# Patient Record
Sex: FEMALE | Race: AMERICAN INDIAN OR ALASKA NATIVE | NOT HISPANIC OR LATINO | Employment: FULL TIME | URBAN - METROPOLITAN AREA
[De-identification: names, ages, dates, MRNs, and addresses within clinical notes are randomized per-mention and may not be internally consistent; named-entity substitution may affect disease eponyms.]

---

## 2020-04-27 ENCOUNTER — TELEPHONE (OUTPATIENT)
Dept: FAMILY MEDICINE | Facility: CLINIC | Age: 70
End: 2020-04-27

## 2020-04-27 NOTE — TELEPHONE ENCOUNTER
----- Message from Elba Mcguire MA sent at 4/27/2020  9:41 AM CDT -----  Contact: self/[phone] (M)      ----- Message -----  From: Annie Conroy  Sent: 4/27/2020   9:37 AM CDT  To: [facility] Staff    Patient called said that she does not want a new pcp that she is okay with the pcp at , but she want to have an appointment with Dr Garrison that she already spoke with him about it. Please call and advise. Thank you

## 2020-04-27 NOTE — TELEPHONE ENCOUNTER
----- Message from Anniesampson Conroy sent at 4/27/2020  9:30 AM CDT -----  Contact: self/[phone] (M)  Requesting an RX refill or new RX.  Is this a refill or new RX:  Refill 1  RX name and strength: Chlorthalidone 25 mg  Directions (copy/paste from chart):    Is this a 30 day or 90 day RX:    Local pharmacy or mail order pharmacy:  Mail order  Pharmacy name and phone # (copy/paste from chart):   [facility] - [place], [place] - [address] [phone] (Phone)  [phone] (Fax)  Comments:      Requesting an RX refill or new RX.  Is this a refill or new RX:  Refill 2  RX name and strength: levothyroxine (SYNTHROID) 88 MCG tablet  Directions (copy/paste from chart):    Is this a 30 day or 90 day RX:    Local pharmacy or mail order pharmacy:  Mail order  Pharmacy name and phone # (copy/paste from chart):   [facility] - [place], [place] - [address] [phone] (Phone)  [phone] (Fax)  Comments:  Patient said that she was told by PCP to call her for refills but patient is not a patient with Dr Garrison here at [facility], she wants to continue with [facility].

## 2020-04-27 NOTE — TELEPHONE ENCOUNTER
----- Message from Hermelindohumera Gene sent at 4/27/2020  9:23 AM CDT -----  Contact: Self   Requesting an RX refill or new RX.  Is this a refill or new RX:    RX name and strength: metoprolol succinate (TOPROL-XL) 25 MG 24 hr tablet  Directions (copy/paste from chart):   Take 2 tablets (50 mg total) by mouth once daily.   Is this a 30 day or 90 day RX:    Local pharmacy or mail order pharmacy:  Mail order  Pharmacy name and phone # (copy/paste from chart):   [facility]  Comments:            Requesting an RX refill or new RX.  Is this a refill or new RX:    RX name and strength: levothyroxine (SYNTHROID) 88 MCG tablet  Directions (copy/paste from chart):  Take 88 mcg by mouth once daily  Is this a 30 day or 90 day RX:    Local pharmacy or mail order pharmacy:  Mail order  Pharmacy name and phone # (copy/paste from chart):   [facility]   Comments:

## 2020-05-28 LAB — HEMOCCULT STL QL IA: NEGATIVE

## 2020-06-09 ENCOUNTER — PATIENT OUTREACH (OUTPATIENT)
Dept: ADMINISTRATIVE | Facility: HOSPITAL | Age: 70
End: 2020-06-09

## 2020-06-09 ENCOUNTER — TELEPHONE (OUTPATIENT)
Dept: ADMINISTRATIVE | Facility: HOSPITAL | Age: 70
End: 2020-06-09

## 2020-07-08 ENCOUNTER — PATIENT OUTREACH (OUTPATIENT)
Dept: ADMINISTRATIVE | Facility: HOSPITAL | Age: 70
End: 2020-07-08

## 2020-07-08 DIAGNOSIS — Z78.0 MENOPAUSE: Primary | ICD-10-CM

## 2020-07-22 ENCOUNTER — OFFICE VISIT (OUTPATIENT)
Dept: FAMILY MEDICINE | Facility: CLINIC | Age: 70
End: 2020-07-22
Payer: COMMERCIAL

## 2020-07-22 VITALS
WEIGHT: 150.25 LBS | OXYGEN SATURATION: 98 % | SYSTOLIC BLOOD PRESSURE: 110 MMHG | BODY MASS INDEX: 25.03 KG/M2 | HEART RATE: 78 BPM | TEMPERATURE: 99 F | DIASTOLIC BLOOD PRESSURE: 62 MMHG | HEIGHT: 65 IN

## 2020-07-22 DIAGNOSIS — I47.19 AVNRT (AV NODAL RE-ENTRY TACHYCARDIA): ICD-10-CM

## 2020-07-22 DIAGNOSIS — Z20.822 CLOSE EXPOSURE TO COVID-19 VIRUS: ICD-10-CM

## 2020-07-22 DIAGNOSIS — Z12.31 SCREENING MAMMOGRAM, ENCOUNTER FOR: ICD-10-CM

## 2020-07-22 DIAGNOSIS — E03.9 HYPOTHYROIDISM (ACQUIRED): ICD-10-CM

## 2020-07-22 DIAGNOSIS — R22.32 SUBCUTANEOUS NODULE OF LEFT FOREARM: ICD-10-CM

## 2020-07-22 DIAGNOSIS — I47.10 SVT (SUPRAVENTRICULAR TACHYCARDIA): ICD-10-CM

## 2020-07-22 DIAGNOSIS — E11.9 TYPE 2 DIABETES MELLITUS WITHOUT COMPLICATION, WITHOUT LONG-TERM CURRENT USE OF INSULIN: Primary | ICD-10-CM

## 2020-07-22 DIAGNOSIS — I10 ESSENTIAL HYPERTENSION: ICD-10-CM

## 2020-07-22 DIAGNOSIS — E78.2 HYPERLIPIDEMIA, MIXED: ICD-10-CM

## 2020-07-22 DIAGNOSIS — Z11.59 SCREENING FOR VIRAL DISEASE: ICD-10-CM

## 2020-07-22 PROCEDURE — 99999 PR PBB SHADOW E&M-EST. PATIENT-LVL IV: CPT | Mod: PBBFAC,,, | Performed by: INTERNAL MEDICINE

## 2020-07-22 PROCEDURE — 99214 OFFICE O/P EST MOD 30 MIN: CPT | Mod: S$GLB,,, | Performed by: INTERNAL MEDICINE

## 2020-07-22 PROCEDURE — 1101F PT FALLS ASSESS-DOCD LE1/YR: CPT | Mod: CPTII,S$GLB,, | Performed by: INTERNAL MEDICINE

## 2020-07-22 PROCEDURE — 3008F BODY MASS INDEX DOCD: CPT | Mod: CPTII,S$GLB,, | Performed by: INTERNAL MEDICINE

## 2020-07-22 PROCEDURE — 3008F PR BODY MASS INDEX (BMI) DOCUMENTED: ICD-10-PCS | Mod: CPTII,S$GLB,, | Performed by: INTERNAL MEDICINE

## 2020-07-22 PROCEDURE — 1101F PR PT FALLS ASSESS DOC 0-1 FALLS W/OUT INJ PAST YR: ICD-10-PCS | Mod: CPTII,S$GLB,, | Performed by: INTERNAL MEDICINE

## 2020-07-22 PROCEDURE — 99499 RISK ADDL DX/OHS AUDIT: ICD-10-PCS | Mod: S$GLB,,, | Performed by: INTERNAL MEDICINE

## 2020-07-22 PROCEDURE — 99999 PR PBB SHADOW E&M-EST. PATIENT-LVL IV: ICD-10-PCS | Mod: PBBFAC,,, | Performed by: INTERNAL MEDICINE

## 2020-07-22 PROCEDURE — 99499 UNLISTED E&M SERVICE: CPT | Mod: S$GLB,,, | Performed by: INTERNAL MEDICINE

## 2020-07-22 PROCEDURE — 1159F MED LIST DOCD IN RCRD: CPT | Mod: S$GLB,,, | Performed by: INTERNAL MEDICINE

## 2020-07-22 PROCEDURE — 99214 PR OFFICE/OUTPT VISIT, EST, LEVL IV, 30-39 MIN: ICD-10-PCS | Mod: S$GLB,,, | Performed by: INTERNAL MEDICINE

## 2020-07-22 PROCEDURE — 1159F PR MEDICATION LIST DOCUMENTED IN MEDICAL RECORD: ICD-10-PCS | Mod: S$GLB,,, | Performed by: INTERNAL MEDICINE

## 2020-07-22 RX ORDER — LEVOTHYROXINE SODIUM 88 UG/1
88 TABLET ORAL DAILY
Qty: 90 TABLET | Refills: 3 | Status: SHIPPED | OUTPATIENT
Start: 2020-07-22 | End: 2021-07-15 | Stop reason: SDUPTHER

## 2020-07-22 RX ORDER — METOPROLOL SUCCINATE 25 MG/1
50 TABLET, EXTENDED RELEASE ORAL DAILY
Qty: 90 TABLET | Refills: 3 | Status: SHIPPED | OUTPATIENT
Start: 2020-07-22 | End: 2021-02-10

## 2020-07-22 NOTE — PROGRESS NOTES
[facility] Note    Chief Complaint      Chief Complaint   Patient presents with    Establish Care       History of Present Illness      Geraldine Longoria is a 69 y.o. female with chronic conditions of DM2, HTN, HLD, hypothyroidism, AVNRT who presents today for: re-establish care from  and review chronic conditions.  Complains of subcutaneous nodule on left forearm.  DM2: Last A1C 6.4 7/2019.  Not on meds.  Eye exam UTD with DR. Ma.  HTN: BP at goal on metoprolol.  Pt wants to try discontinuing.  HLD: Last .  Due for repeat.  Hypothyroidism: Controlled on synthroid.  TSH due for repeat.  AVNRT: On metoprolol to control.  Has seen Cardiology previously.  Flu shot declines.  Td 2007, declines.  Pneumovax, prevnar declines.  Shingrix declines.  Mammogram due, will schedule.  FIT 2020 negative.    Past Medical History:  Past Medical History:   Diagnosis Date    Thyroid disease        Past Surgical History:   has a past surgical history that includes Kidney surgery.    Family History:  family history is not on file.     Social History:  Social History     Tobacco Use    Smoking status: Never Smoker   Substance Use Topics    Alcohol use: No    Drug use: Not on file       Review of Systems   Constitutional: Negative for chills, fever and malaise/fatigue.   Respiratory: Negative for shortness of breath.    Cardiovascular: Negative for chest pain.   Gastrointestinal: Negative for constipation, diarrhea, nausea and vomiting.   Skin: Negative for rash.   Neurological: Negative for weakness.        Medications:  Outpatient Encounter Medications as of 7/22/2020   Medication Sig Dispense Refill    levothyroxine (SYNTHROID) 88 MCG tablet Take 88 mcg by mouth once daily.      metoprolol succinate (TOPROL-XL) 25 MG 24 hr tablet Take 2 tablets (50 mg total) by mouth once daily. 90 tablet 11     No facility-administered encounter medications on file as of 7/22/2020.        Allergies:  Review of patient's  "allergies indicates:  No Known Allergies    Health Maintenance:  Immunization History   Administered Date(s) Administered    Td (ADULT) 10/19/2007      Health Maintenance   Topic Date Due    Hepatitis C Screening  1950    Lipid Panel  1950    TETANUS VACCINE  07/27/1969    DEXA SCAN  07/27/1991    Pneumococcal Vaccine (65+ Low/Medium Risk) (1 of 2 - PCV13) 07/27/2016    Mammogram  09/18/2021        Physical Exam      Vital Signs  Temp: 98.9 °F (37.2 °C)  Temp src: Oral  Pulse: 78  SpO2: 98 %  BP: 110/62  BP Location: Left arm  Patient Position: Sitting  Height and Weight  Height: 5' 5" (165.1 cm)  Weight: 68.2 kg (150 lb 3.9 oz)  BSA (Calculated - sq m): 1.77 sq meters  BMI (Calculated): 25  Weight in (lb) to have BMI = 25: 149.9]    Physical Exam  Vitals signs reviewed.   Constitutional:       Appearance: She is well-developed.   HENT:      Head: Normocephalic and atraumatic.      Right Ear: External ear normal.      Left Ear: External ear normal.   Eyes:      Conjunctiva/sclera: Conjunctivae normal.      Pupils: Pupils are equal, round, and reactive to light.   Neck:      Vascular: No carotid bruit.   Cardiovascular:      Rate and Rhythm: Normal rate and regular rhythm.      Heart sounds: Normal heart sounds. No murmur.   Pulmonary:      Effort: Pulmonary effort is normal.      Breath sounds: Normal breath sounds. No wheezing or rales.   Abdominal:      General: Bowel sounds are normal. There is no distension.      Palpations: Abdomen is soft.      Tenderness: There is no abdominal tenderness.          Laboratory:  CBC:      CMP:        Invalid input(s): CREATININ  URINALYSIS:       LIPIDS:      TSH:      A1C:        Assessment/Plan     Geraldine Longoria is a 69 y.o.female with:    1. Type 2 diabetes mellitus without complication, without long-term current use of insulin  - CBC auto differential; Future  - Comprehensive metabolic panel; Future  - Hemoglobin A1C; Future  - Microalbumin/creatinine " urine ratio; Future  Continue diet.  Update labs.  Request eye exam.  2. Essential hypertension  - Comprehensive metabolic panel; Future  Continue current meds.  Ok to discontinue to see if she still needs it.  3. Hyperlipidemia, mixed  - Comprehensive metabolic panel; Future  - Lipid Panel; Future  Cont diet.  Update labs  4. Hypothyroidism (acquired)  - CBC auto differential; Future  - TSH; Future  - T4, free; Future  Continue current meds.  UPdate labs  5. AVNRT (AV aftab re-entry tachycardia)  Continue current meds.  Ok to discontinue if preferred  6. Subcutaneous nodule of left forearm  - US Extremity Non Vascular Complete Left; Future  Evaluate with ultrasound  7. Screening for viral disease  - COVID-19 (SARS CoV-2) IgG Antibody; Future    8. Close Exposure to Covid-19 Virus  - COVID-19 (SARS CoV-2) IgG Antibody; Future    9. Screening mammogram, encounter for  - Mammo Digital Screening Bilat w/ Tirso; Future       Chronic conditions status updated as per HPI.  Other than changes above, cont current medications and maintain follow up with specialists.  Return to clinic in 6 months.    Brett Garrison MD  [facility]

## 2020-07-27 LAB
ALBUMIN/CREAT UR: NORMAL MCG/MG CREAT
CREAT UR-MCNC: 52 MG/DL (ref 20–275)
FREE T4: 1.5 NANOGRAM/DL (ref 0.9–1.7)
MICROALBUMIN UR-MCNC: <0.2 MG/DL
SARS-COV-2 IGG SERPL QL IA: NEGATIVE

## 2020-07-29 RX ORDER — CHLORTHALIDONE 25 MG/1
25 TABLET ORAL DAILY
COMMUNITY
End: 2020-07-29 | Stop reason: SDUPTHER

## 2020-07-29 RX ORDER — CHLORTHALIDONE 25 MG/1
25 TABLET ORAL DAILY
Qty: 90 TABLET | Refills: 3 | Status: SHIPPED | OUTPATIENT
Start: 2020-07-29 | End: 2021-07-15 | Stop reason: SDUPTHER

## 2020-07-29 NOTE — TELEPHONE ENCOUNTER
Care Due:                  Date            Visit Type   Department     Provider  --------------------------------------------------------------------------------                                ESTABLISHED   [facility]  Last Visit: 07-      PATIENT      [facility]  Brett Lenoard                              ESTABLISHED   [facility]  Next Visit: 01-      [facility]  Brett Leonard                                                            Last  Test          Frequency    Reason                     Performed    Due Date  --------------------------------------------------------------------------------    TSH.........  12 months..  levothyroxine............  Not Found    Overdue    Powered by [employer]. Reference number: [id number]. 7/29/2020 12:01:54 PM CDT

## 2020-07-29 NOTE — TELEPHONE ENCOUNTER
----- Message from Natalei Mcgrath sent at 7/29/2020 11:38 AM CDT -----  Contact: Pt [phone]  Patient is requesting a call about a prescription.    Please call and advise.    Thank You

## 2020-07-29 NOTE — TELEPHONE ENCOUNTER
Spoke with pt. Pt stated that the wrong medication was sent to [facility]. Pt states that she has never taken metoprolol. She is only taking Chlorthalidone 25 mg for blood pressure. Pt stated that she would like that rx to be sent to [facility]. Please advise.

## 2020-09-29 ENCOUNTER — PATIENT MESSAGE (OUTPATIENT)
Dept: OTHER | Facility: OTHER | Age: 70
End: 2020-09-29

## 2020-10-14 LAB
ALBUMIN: 4.1 GRAM/DL (ref 3.5–5)
ALP SERPL-CCNC: 82 UNIT/L (ref 38–126)
ALT SERPL W P-5'-P-CCNC: <10 UNIT/L (ref 7–56)
ANION GAP SERPL CALC-SCNC: 15 MEQ/L (ref 9–18)
AST SERPL-CCNC: 23 UNIT/L (ref 7–40)
BASOPHILS ABSOLUTE COUNT: 0 K/UL (ref 0–0.2)
BASOPHILS NFR BLD: 0.8 % (ref 0–2)
BILIRUB SERPL-MCNC: 0.4 MG/DL (ref 0–1.2)
BUN BLD-MCNC: 8 MG/DL (ref 7–21)
BUN/CREAT SERPL: 9 RATIO (ref 6–22)
CALC OSMOLALITY: 275 MOSM/KG (ref 275–295)
CALCIUM SERPL-MCNC: 10.2 MG/DL (ref 8.5–10.3)
CHLORIDE SERPL-SCNC: 100 MEQ/L (ref 98–107)
CHOL/HDLC RATIO: 4
CHOLEST SERPL-MSCNC: 252 MG/DL (ref 100–200)
CO2 SERPL-SCNC: 27 MEQ/L (ref 21–31)
CREAT SERPL-MCNC: 0.9 MG/DL (ref 0.5–1)
DIFFERENTIAL TYPE: NORMAL
EOSINOPHIL NFR BLD: 2.1 % (ref 0–4)
EOSINOPHILS ABSOLUTE COUNT: 0.1 K/UL (ref 0–0.7)
ERYTHROCYTE [DISTWIDTH] IN BLOOD BY AUTOMATED COUNT: 13.1 GRAM/DL (ref 12–15.3)
GFR: 63.9 ML/MIN/1.73M2
GLUCOSE SERPL-MCNC: 114 MG/DL (ref 70–100)
HBA1C MFR BLD: 6.4 % (ref 4.5–5.7)
HCT VFR BLD AUTO: 37 % (ref 37–47)
HDLC SERPL-MCNC: 71 MG/DL (ref 40–75)
HGB BLD-MCNC: 12.6 GRAM/DL (ref 12–16)
LDLC SERPL CALC-MCNC: 166 MG/DL (ref 0–125)
LYMPHOCYTES %: 28.4 % (ref 15–45)
LYMPHOCYTES ABSOLUTE COUNT: 1.5 K/UL (ref 1–4.2)
MCH RBC QN AUTO: 28.4 PICOGRAM (ref 27–33)
MCHC RBC AUTO-ENTMCNC: 34.1 GRAM/DL (ref 32–36)
MCV RBC AUTO: 83.1 FEMTOLITER (ref 81–99)
MONOCYTES %: 6.5 % (ref 3–13)
MONOCYTES ABSOLUTE COUNT: 0.4 K/UL (ref 0.1–0.8)
NEUTROPHILS ABSOLUTE COUNT: 3.3 K/UL (ref 2.1–7.6)
NEUTROPHILS RELATIVE PERCENT: 62.2 % (ref 32–80)
NONHDLC SERPL-MCNC: 181 MG/DL (ref 60–125)
PLATELET # BLD AUTO: 224 K/UL (ref 150–350)
PMV BLD AUTO: 10.2 FEMTOLITER (ref 7–10.2)
POTASSIUM SERPL-SCNC: 4 MEQ/L (ref 3.5–5)
RBC # BLD AUTO: 4.45 MIL/UL (ref 4.2–5.4)
SODIUM BLD-SCNC: 138 MEQ/L (ref 135–145)
TOTAL PROTEIN: 6.6 GRAM/DL (ref 6.3–8.2)
TRIGL SERPL-MCNC: 95 MG/DL (ref 30–150)
TSH SERPL DL<=0.005 MIU/L-ACNC: 1.12 UIL/ML (ref 0.35–4)
WBC # BLD AUTO: 5.4 K/UL (ref 4.5–11)

## 2020-12-02 ENCOUNTER — TELEPHONE (OUTPATIENT)
Dept: FAMILY MEDICINE | Facility: CLINIC | Age: 70
End: 2020-12-02

## 2020-12-02 NOTE — TELEPHONE ENCOUNTER
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  Can be surgically removed if becomes significantly large or painful.  Otherwise, leave it be

## 2020-12-09 ENCOUNTER — PATIENT OUTREACH (OUTPATIENT)
Dept: ADMINISTRATIVE | Facility: HOSPITAL | Age: 70
End: 2020-12-09

## 2020-12-09 ENCOUNTER — TELEPHONE (OUTPATIENT)
Dept: ADMINISTRATIVE | Facility: HOSPITAL | Age: 70
End: 2020-12-09

## 2020-12-10 ENCOUNTER — TELEPHONE (OUTPATIENT)
Dept: FAMILY MEDICINE | Facility: CLINIC | Age: 70
End: 2020-12-10

## 2020-12-10 NOTE — TELEPHONE ENCOUNTER
----- Message from Tamara Jensen sent at 12/10/2020  9:36 AM CST -----  Regarding: Call back  Contact: [phone]  Patient Returning Your Phone Call

## 2020-12-11 ENCOUNTER — PATIENT MESSAGE (OUTPATIENT)
Dept: OTHER | Facility: OTHER | Age: 70
End: 2020-12-11

## 2021-02-10 ENCOUNTER — OFFICE VISIT (OUTPATIENT)
Dept: FAMILY MEDICINE | Facility: CLINIC | Age: 71
End: 2021-02-10
Payer: COMMERCIAL

## 2021-02-10 VITALS
WEIGHT: 143.31 LBS | OXYGEN SATURATION: 93 % | TEMPERATURE: 98 F | SYSTOLIC BLOOD PRESSURE: 102 MMHG | DIASTOLIC BLOOD PRESSURE: 70 MMHG | BODY MASS INDEX: 23.88 KG/M2 | HEIGHT: 65 IN | HEART RATE: 77 BPM

## 2021-02-10 DIAGNOSIS — I10 ESSENTIAL HYPERTENSION: ICD-10-CM

## 2021-02-10 DIAGNOSIS — E11.9 TYPE 2 DIABETES MELLITUS WITHOUT COMPLICATION, WITHOUT LONG-TERM CURRENT USE OF INSULIN: Primary | ICD-10-CM

## 2021-02-10 DIAGNOSIS — I47.10 SVT (SUPRAVENTRICULAR TACHYCARDIA): ICD-10-CM

## 2021-02-10 PROCEDURE — 3074F PR MOST RECENT SYSTOLIC BLOOD PRESSURE < 130 MM HG: ICD-10-PCS | Mod: CPTII,S$GLB,, | Performed by: INTERNAL MEDICINE

## 2021-02-10 PROCEDURE — 99499 RISK ADDL DX/OHS AUDIT: ICD-10-PCS | Mod: HCNC,S$GLB,, | Performed by: INTERNAL MEDICINE

## 2021-02-10 PROCEDURE — 3044F HG A1C LEVEL LT 7.0%: CPT | Mod: CPTII,S$GLB,, | Performed by: INTERNAL MEDICINE

## 2021-02-10 PROCEDURE — 3078F PR MOST RECENT DIASTOLIC BLOOD PRESSURE < 80 MM HG: ICD-10-PCS | Mod: CPTII,S$GLB,, | Performed by: INTERNAL MEDICINE

## 2021-02-10 PROCEDURE — 99499 UNLISTED E&M SERVICE: CPT | Mod: HCNC,S$GLB,, | Performed by: INTERNAL MEDICINE

## 2021-02-10 PROCEDURE — 3008F PR BODY MASS INDEX (BMI) DOCUMENTED: ICD-10-PCS | Mod: CPTII,S$GLB,, | Performed by: INTERNAL MEDICINE

## 2021-02-10 PROCEDURE — 99999 PR PBB SHADOW E&M-EST. PATIENT-LVL III: CPT | Mod: PBBFAC,,, | Performed by: INTERNAL MEDICINE

## 2021-02-10 PROCEDURE — 1126F PR PAIN SEVERITY QUANTIFIED, NO PAIN PRESENT: ICD-10-PCS | Mod: S$GLB,,, | Performed by: INTERNAL MEDICINE

## 2021-02-10 PROCEDURE — 1126F AMNT PAIN NOTED NONE PRSNT: CPT | Mod: S$GLB,,, | Performed by: INTERNAL MEDICINE

## 2021-02-10 PROCEDURE — 1159F MED LIST DOCD IN RCRD: CPT | Mod: S$GLB,,, | Performed by: INTERNAL MEDICINE

## 2021-02-10 PROCEDURE — 3044F PR MOST RECENT HEMOGLOBIN A1C LEVEL <7.0%: ICD-10-PCS | Mod: CPTII,S$GLB,, | Performed by: INTERNAL MEDICINE

## 2021-02-10 PROCEDURE — 1101F PT FALLS ASSESS-DOCD LE1/YR: CPT | Mod: CPTII,S$GLB,, | Performed by: INTERNAL MEDICINE

## 2021-02-10 PROCEDURE — 3288F PR FALLS RISK ASSESSMENT DOCUMENTED: ICD-10-PCS | Mod: CPTII,S$GLB,, | Performed by: INTERNAL MEDICINE

## 2021-02-10 PROCEDURE — 3078F DIAST BP <80 MM HG: CPT | Mod: CPTII,S$GLB,, | Performed by: INTERNAL MEDICINE

## 2021-02-10 PROCEDURE — 99214 OFFICE O/P EST MOD 30 MIN: CPT | Mod: S$GLB,,, | Performed by: INTERNAL MEDICINE

## 2021-02-10 PROCEDURE — 3074F SYST BP LT 130 MM HG: CPT | Mod: CPTII,S$GLB,, | Performed by: INTERNAL MEDICINE

## 2021-02-10 PROCEDURE — 1159F PR MEDICATION LIST DOCUMENTED IN MEDICAL RECORD: ICD-10-PCS | Mod: S$GLB,,, | Performed by: INTERNAL MEDICINE

## 2021-02-10 PROCEDURE — 3008F BODY MASS INDEX DOCD: CPT | Mod: CPTII,S$GLB,, | Performed by: INTERNAL MEDICINE

## 2021-02-10 PROCEDURE — 99214 PR OFFICE/OUTPT VISIT, EST, LEVL IV, 30-39 MIN: ICD-10-PCS | Mod: S$GLB,,, | Performed by: INTERNAL MEDICINE

## 2021-02-10 PROCEDURE — 99999 PR PBB SHADOW E&M-EST. PATIENT-LVL III: ICD-10-PCS | Mod: PBBFAC,,, | Performed by: INTERNAL MEDICINE

## 2021-02-10 PROCEDURE — 1101F PR PT FALLS ASSESS DOC 0-1 FALLS W/OUT INJ PAST YR: ICD-10-PCS | Mod: CPTII,S$GLB,, | Performed by: INTERNAL MEDICINE

## 2021-02-10 PROCEDURE — 3288F FALL RISK ASSESSMENT DOCD: CPT | Mod: CPTII,S$GLB,, | Performed by: INTERNAL MEDICINE

## 2021-02-22 ENCOUNTER — PATIENT MESSAGE (OUTPATIENT)
Dept: PRIMARY CARE CLINIC | Facility: CLINIC | Age: 71
End: 2021-02-22

## 2021-03-08 ENCOUNTER — PATIENT MESSAGE (OUTPATIENT)
Dept: ADMINISTRATIVE | Facility: HOSPITAL | Age: 71
End: 2021-03-08

## 2021-04-13 ENCOUNTER — PES CALL (OUTPATIENT)
Dept: ADMINISTRATIVE | Facility: CLINIC | Age: 71
End: 2021-04-13

## 2021-04-16 ENCOUNTER — PATIENT MESSAGE (OUTPATIENT)
Dept: RESEARCH | Facility: HOSPITAL | Age: 71
End: 2021-04-16

## 2021-04-16 ENCOUNTER — IMMUNIZATION (OUTPATIENT)
Dept: FAMILY MEDICINE | Facility: CLINIC | Age: 71
End: 2021-04-16

## 2021-04-16 DIAGNOSIS — Z23 NEED FOR VACCINATION: Primary | ICD-10-CM

## 2021-04-16 PROCEDURE — 91300 COVID-19, MRNA, LNP-S, PF, 30 MCG/0.3 ML DOSE VACCINE: CPT | Mod: ,,, | Performed by: FAMILY MEDICINE

## 2021-04-16 PROCEDURE — 0001A COVID-19, MRNA, LNP-S, PF, 30 MCG/0.3 ML DOSE VACCINE: CPT | Mod: CV19,,, | Performed by: FAMILY MEDICINE

## 2021-04-16 PROCEDURE — 0001A COVID-19, MRNA, LNP-S, PF, 30 MCG/0.3 ML DOSE VACCINE: ICD-10-PCS | Mod: CV19,,, | Performed by: FAMILY MEDICINE

## 2021-04-16 PROCEDURE — 91300 COVID-19, MRNA, LNP-S, PF, 30 MCG/0.3 ML DOSE VACCINE: ICD-10-PCS | Mod: ,,, | Performed by: FAMILY MEDICINE

## 2021-05-07 ENCOUNTER — IMMUNIZATION (OUTPATIENT)
Dept: FAMILY MEDICINE | Facility: CLINIC | Age: 71
End: 2021-05-07
Payer: COMMERCIAL

## 2021-05-07 DIAGNOSIS — Z23 NEED FOR VACCINATION: Primary | ICD-10-CM

## 2021-05-07 PROCEDURE — 91300 COVID-19, MRNA, LNP-S, PF, 30 MCG/0.3 ML DOSE VACCINE: ICD-10-PCS | Mod: S$GLB,,, | Performed by: FAMILY MEDICINE

## 2021-05-07 PROCEDURE — 91300 COVID-19, MRNA, LNP-S, PF, 30 MCG/0.3 ML DOSE VACCINE: CPT | Mod: S$GLB,,, | Performed by: FAMILY MEDICINE

## 2021-05-07 PROCEDURE — 0002A COVID-19, MRNA, LNP-S, PF, 30 MCG/0.3 ML DOSE VACCINE: ICD-10-PCS | Mod: CV19,S$GLB,, | Performed by: FAMILY MEDICINE

## 2021-05-07 PROCEDURE — 0002A COVID-19, MRNA, LNP-S, PF, 30 MCG/0.3 ML DOSE VACCINE: CPT | Mod: CV19,S$GLB,, | Performed by: FAMILY MEDICINE

## 2021-06-02 LAB — HEMOCCULT STL QL IA: NEGATIVE

## 2021-06-29 ENCOUNTER — PATIENT OUTREACH (OUTPATIENT)
Dept: ADMINISTRATIVE | Facility: HOSPITAL | Age: 71
End: 2021-06-29

## 2021-06-29 ENCOUNTER — TELEPHONE (OUTPATIENT)
Dept: ADMINISTRATIVE | Facility: HOSPITAL | Age: 71
End: 2021-06-29

## 2021-07-14 ENCOUNTER — TELEPHONE (OUTPATIENT)
Dept: FAMILY MEDICINE | Facility: CLINIC | Age: 71
End: 2021-07-14

## 2021-07-15 ENCOUNTER — OFFICE VISIT (OUTPATIENT)
Dept: FAMILY MEDICINE | Facility: CLINIC | Age: 71
End: 2021-07-15
Payer: COMMERCIAL

## 2021-07-15 VITALS
DIASTOLIC BLOOD PRESSURE: 70 MMHG | BODY MASS INDEX: 24.43 KG/M2 | SYSTOLIC BLOOD PRESSURE: 120 MMHG | OXYGEN SATURATION: 98 % | HEIGHT: 65 IN | HEART RATE: 73 BPM | WEIGHT: 146.63 LBS | TEMPERATURE: 98 F

## 2021-07-15 DIAGNOSIS — Z00.00 ANNUAL PHYSICAL EXAM: ICD-10-CM

## 2021-07-15 DIAGNOSIS — E78.2 HYPERLIPIDEMIA, MIXED: ICD-10-CM

## 2021-07-15 DIAGNOSIS — N95.1 PERIMENOPAUSE: ICD-10-CM

## 2021-07-15 DIAGNOSIS — K59.00 CONSTIPATION, UNSPECIFIED CONSTIPATION TYPE: Primary | ICD-10-CM

## 2021-07-15 DIAGNOSIS — E11.9 TYPE 2 DIABETES MELLITUS WITHOUT COMPLICATION, WITHOUT LONG-TERM CURRENT USE OF INSULIN: ICD-10-CM

## 2021-07-15 DIAGNOSIS — E03.9 HYPOTHYROIDISM (ACQUIRED): ICD-10-CM

## 2021-07-15 DIAGNOSIS — I10 ESSENTIAL HYPERTENSION: ICD-10-CM

## 2021-07-15 PROCEDURE — 99214 OFFICE O/P EST MOD 30 MIN: CPT | Mod: S$GLB,,, | Performed by: INTERNAL MEDICINE

## 2021-07-15 PROCEDURE — 3008F PR BODY MASS INDEX (BMI) DOCUMENTED: ICD-10-PCS | Mod: CPTII,S$GLB,, | Performed by: INTERNAL MEDICINE

## 2021-07-15 PROCEDURE — 3044F PR MOST RECENT HEMOGLOBIN A1C LEVEL <7.0%: ICD-10-PCS | Mod: CPTII,S$GLB,, | Performed by: INTERNAL MEDICINE

## 2021-07-15 PROCEDURE — 3008F BODY MASS INDEX DOCD: CPT | Mod: CPTII,S$GLB,, | Performed by: INTERNAL MEDICINE

## 2021-07-15 PROCEDURE — 3078F PR MOST RECENT DIASTOLIC BLOOD PRESSURE < 80 MM HG: ICD-10-PCS | Mod: CPTII,S$GLB,, | Performed by: INTERNAL MEDICINE

## 2021-07-15 PROCEDURE — 99999 PR PBB SHADOW E&M-EST. PATIENT-LVL IV: ICD-10-PCS | Mod: PBBFAC,,, | Performed by: INTERNAL MEDICINE

## 2021-07-15 PROCEDURE — 99999 PR PBB SHADOW E&M-EST. PATIENT-LVL IV: CPT | Mod: PBBFAC,,, | Performed by: INTERNAL MEDICINE

## 2021-07-15 PROCEDURE — 3074F PR MOST RECENT SYSTOLIC BLOOD PRESSURE < 130 MM HG: ICD-10-PCS | Mod: CPTII,S$GLB,, | Performed by: INTERNAL MEDICINE

## 2021-07-15 PROCEDURE — 1159F PR MEDICATION LIST DOCUMENTED IN MEDICAL RECORD: ICD-10-PCS | Mod: S$GLB,,, | Performed by: INTERNAL MEDICINE

## 2021-07-15 PROCEDURE — 3044F HG A1C LEVEL LT 7.0%: CPT | Mod: CPTII,S$GLB,, | Performed by: INTERNAL MEDICINE

## 2021-07-15 PROCEDURE — 99214 PR OFFICE/OUTPT VISIT, EST, LEVL IV, 30-39 MIN: ICD-10-PCS | Mod: S$GLB,,, | Performed by: INTERNAL MEDICINE

## 2021-07-15 PROCEDURE — 3074F SYST BP LT 130 MM HG: CPT | Mod: CPTII,S$GLB,, | Performed by: INTERNAL MEDICINE

## 2021-07-15 PROCEDURE — 3078F DIAST BP <80 MM HG: CPT | Mod: CPTII,S$GLB,, | Performed by: INTERNAL MEDICINE

## 2021-07-15 PROCEDURE — 1159F MED LIST DOCD IN RCRD: CPT | Mod: S$GLB,,, | Performed by: INTERNAL MEDICINE

## 2021-07-15 RX ORDER — LEVOTHYROXINE SODIUM 88 UG/1
88 TABLET ORAL DAILY
Qty: 90 TABLET | Refills: 3 | Status: SHIPPED | OUTPATIENT
Start: 2021-07-15 | End: 2022-09-18

## 2021-07-15 RX ORDER — CHLORTHALIDONE 25 MG/1
25 TABLET ORAL DAILY
Qty: 90 TABLET | Refills: 3 | Status: SHIPPED | OUTPATIENT
Start: 2021-07-15 | End: 2022-09-18

## 2021-07-19 ENCOUNTER — TELEPHONE (OUTPATIENT)
Dept: FAMILY MEDICINE | Facility: CLINIC | Age: 71
End: 2021-07-19

## 2021-08-10 ENCOUNTER — OFFICE VISIT (OUTPATIENT)
Dept: FAMILY MEDICINE | Facility: CLINIC | Age: 71
End: 2021-08-10
Payer: COMMERCIAL

## 2021-08-10 VITALS
TEMPERATURE: 98 F | HEIGHT: 65 IN | BODY MASS INDEX: 25.08 KG/M2 | DIASTOLIC BLOOD PRESSURE: 72 MMHG | OXYGEN SATURATION: 98 % | HEART RATE: 77 BPM | SYSTOLIC BLOOD PRESSURE: 126 MMHG | WEIGHT: 150.56 LBS

## 2021-08-10 DIAGNOSIS — I10 ESSENTIAL HYPERTENSION: ICD-10-CM

## 2021-08-10 DIAGNOSIS — E11.9 TYPE 2 DIABETES MELLITUS WITHOUT COMPLICATION, WITHOUT LONG-TERM CURRENT USE OF INSULIN: Primary | ICD-10-CM

## 2021-08-10 DIAGNOSIS — I47.19 AVNRT (AV NODAL RE-ENTRY TACHYCARDIA): ICD-10-CM

## 2021-08-10 DIAGNOSIS — I47.10 SVT (SUPRAVENTRICULAR TACHYCARDIA): ICD-10-CM

## 2021-08-10 DIAGNOSIS — E03.9 HYPOTHYROIDISM (ACQUIRED): ICD-10-CM

## 2021-08-10 DIAGNOSIS — E78.2 HYPERLIPIDEMIA, MIXED: ICD-10-CM

## 2021-08-10 PROCEDURE — 3074F SYST BP LT 130 MM HG: CPT | Mod: CPTII,S$GLB,, | Performed by: INTERNAL MEDICINE

## 2021-08-10 PROCEDURE — 3044F HG A1C LEVEL LT 7.0%: CPT | Mod: CPTII,S$GLB,, | Performed by: INTERNAL MEDICINE

## 2021-08-10 PROCEDURE — 99214 PR OFFICE/OUTPT VISIT, EST, LEVL IV, 30-39 MIN: ICD-10-PCS | Mod: S$GLB,,, | Performed by: INTERNAL MEDICINE

## 2021-08-10 PROCEDURE — 3074F PR MOST RECENT SYSTOLIC BLOOD PRESSURE < 130 MM HG: ICD-10-PCS | Mod: CPTII,S$GLB,, | Performed by: INTERNAL MEDICINE

## 2021-08-10 PROCEDURE — 99999 PR PBB SHADOW E&M-EST. PATIENT-LVL III: CPT | Mod: PBBFAC,,, | Performed by: INTERNAL MEDICINE

## 2021-08-10 PROCEDURE — 99214 OFFICE O/P EST MOD 30 MIN: CPT | Mod: S$GLB,,, | Performed by: INTERNAL MEDICINE

## 2021-08-10 PROCEDURE — 1159F PR MEDICATION LIST DOCUMENTED IN MEDICAL RECORD: ICD-10-PCS | Mod: CPTII,S$GLB,, | Performed by: INTERNAL MEDICINE

## 2021-08-10 PROCEDURE — 3008F PR BODY MASS INDEX (BMI) DOCUMENTED: ICD-10-PCS | Mod: CPTII,S$GLB,, | Performed by: INTERNAL MEDICINE

## 2021-08-10 PROCEDURE — 1159F MED LIST DOCD IN RCRD: CPT | Mod: CPTII,S$GLB,, | Performed by: INTERNAL MEDICINE

## 2021-08-10 PROCEDURE — 1126F PR PAIN SEVERITY QUANTIFIED, NO PAIN PRESENT: ICD-10-PCS | Mod: CPTII,S$GLB,, | Performed by: INTERNAL MEDICINE

## 2021-08-10 PROCEDURE — 3078F DIAST BP <80 MM HG: CPT | Mod: CPTII,S$GLB,, | Performed by: INTERNAL MEDICINE

## 2021-08-10 PROCEDURE — 1160F RVW MEDS BY RX/DR IN RCRD: CPT | Mod: CPTII,S$GLB,, | Performed by: INTERNAL MEDICINE

## 2021-08-10 PROCEDURE — 3044F PR MOST RECENT HEMOGLOBIN A1C LEVEL <7.0%: ICD-10-PCS | Mod: CPTII,S$GLB,, | Performed by: INTERNAL MEDICINE

## 2021-08-10 PROCEDURE — 1160F PR REVIEW ALL MEDS BY PRESCRIBER/CLIN PHARMACIST DOCUMENTED: ICD-10-PCS | Mod: CPTII,S$GLB,, | Performed by: INTERNAL MEDICINE

## 2021-08-10 PROCEDURE — 99999 PR PBB SHADOW E&M-EST. PATIENT-LVL III: ICD-10-PCS | Mod: PBBFAC,,, | Performed by: INTERNAL MEDICINE

## 2021-08-10 PROCEDURE — 3078F PR MOST RECENT DIASTOLIC BLOOD PRESSURE < 80 MM HG: ICD-10-PCS | Mod: CPTII,S$GLB,, | Performed by: INTERNAL MEDICINE

## 2021-08-10 PROCEDURE — 3008F BODY MASS INDEX DOCD: CPT | Mod: CPTII,S$GLB,, | Performed by: INTERNAL MEDICINE

## 2021-08-10 PROCEDURE — 1126F AMNT PAIN NOTED NONE PRSNT: CPT | Mod: CPTII,S$GLB,, | Performed by: INTERNAL MEDICINE

## 2021-12-07 ENCOUNTER — IMMUNIZATION (OUTPATIENT)
Dept: FAMILY MEDICINE | Facility: CLINIC | Age: 71
End: 2021-12-07
Payer: MEDICARE

## 2021-12-07 DIAGNOSIS — Z23 NEED FOR VACCINATION: Primary | ICD-10-CM

## 2021-12-07 PROCEDURE — 0004A COVID-19, MRNA, LNP-S, PF, 30 MCG/0.3 ML DOSE VACCINE: CPT | Mod: HCNC,PBBFAC | Performed by: FAMILY MEDICINE

## 2021-12-30 ENCOUNTER — TELEPHONE (OUTPATIENT)
Dept: INTERNAL MEDICINE | Facility: CLINIC | Age: 71
End: 2021-12-30
Payer: MEDICARE

## 2022-01-10 ENCOUNTER — PATIENT MESSAGE (OUTPATIENT)
Dept: ADMINISTRATIVE | Facility: HOSPITAL | Age: 72
End: 2022-01-10
Payer: MEDICARE

## 2022-03-13 DIAGNOSIS — E11.9 TYPE 2 DIABETES MELLITUS WITHOUT COMPLICATION: ICD-10-CM

## 2022-05-09 ENCOUNTER — OFFICE VISIT (OUTPATIENT)
Dept: INTERNAL MEDICINE | Facility: CLINIC | Age: 72
End: 2022-05-09
Payer: MEDICARE

## 2022-05-09 ENCOUNTER — PATIENT MESSAGE (OUTPATIENT)
Dept: ADMINISTRATIVE | Facility: HOSPITAL | Age: 72
End: 2022-05-09
Payer: MEDICARE

## 2022-05-09 VITALS
HEIGHT: 65 IN | BODY MASS INDEX: 25.61 KG/M2 | DIASTOLIC BLOOD PRESSURE: 70 MMHG | OXYGEN SATURATION: 99 % | HEART RATE: 83 BPM | WEIGHT: 153.69 LBS | SYSTOLIC BLOOD PRESSURE: 124 MMHG

## 2022-05-09 DIAGNOSIS — K21.9 GERD WITHOUT ESOPHAGITIS: ICD-10-CM

## 2022-05-09 DIAGNOSIS — I10 ESSENTIAL HYPERTENSION: ICD-10-CM

## 2022-05-09 DIAGNOSIS — E78.2 HYPERLIPIDEMIA, MIXED: ICD-10-CM

## 2022-05-09 DIAGNOSIS — Z12.31 SCREENING MAMMOGRAM, ENCOUNTER FOR: ICD-10-CM

## 2022-05-09 DIAGNOSIS — Z78.0 POSTMENOPAUSAL: ICD-10-CM

## 2022-05-09 DIAGNOSIS — E03.9 HYPOTHYROIDISM (ACQUIRED): ICD-10-CM

## 2022-05-09 DIAGNOSIS — I47.19 AVNRT (AV NODAL RE-ENTRY TACHYCARDIA): ICD-10-CM

## 2022-05-09 DIAGNOSIS — E11.9 TYPE 2 DIABETES MELLITUS WITHOUT COMPLICATION, WITHOUT LONG-TERM CURRENT USE OF INSULIN: Primary | ICD-10-CM

## 2022-05-09 PROCEDURE — 99999 PR PBB SHADOW E&M-EST. PATIENT-LVL V: ICD-10-PCS | Mod: PBBFAC,,, | Performed by: INTERNAL MEDICINE

## 2022-05-09 PROCEDURE — 1126F AMNT PAIN NOTED NONE PRSNT: CPT | Mod: CPTII,S$GLB,, | Performed by: INTERNAL MEDICINE

## 2022-05-09 PROCEDURE — 1159F PR MEDICATION LIST DOCUMENTED IN MEDICAL RECORD: ICD-10-PCS | Mod: CPTII,S$GLB,, | Performed by: INTERNAL MEDICINE

## 2022-05-09 PROCEDURE — 99499 RISK ADDL DX/OHS AUDIT: ICD-10-PCS | Mod: S$GLB,,, | Performed by: INTERNAL MEDICINE

## 2022-05-09 PROCEDURE — 3074F PR MOST RECENT SYSTOLIC BLOOD PRESSURE < 130 MM HG: ICD-10-PCS | Mod: CPTII,S$GLB,, | Performed by: INTERNAL MEDICINE

## 2022-05-09 PROCEDURE — 1159F MED LIST DOCD IN RCRD: CPT | Mod: CPTII,S$GLB,, | Performed by: INTERNAL MEDICINE

## 2022-05-09 PROCEDURE — 3078F PR MOST RECENT DIASTOLIC BLOOD PRESSURE < 80 MM HG: ICD-10-PCS | Mod: CPTII,S$GLB,, | Performed by: INTERNAL MEDICINE

## 2022-05-09 PROCEDURE — 99214 PR OFFICE/OUTPT VISIT, EST, LEVL IV, 30-39 MIN: ICD-10-PCS | Mod: S$GLB,,, | Performed by: INTERNAL MEDICINE

## 2022-05-09 PROCEDURE — 3074F SYST BP LT 130 MM HG: CPT | Mod: CPTII,S$GLB,, | Performed by: INTERNAL MEDICINE

## 2022-05-09 PROCEDURE — 1160F PR REVIEW ALL MEDS BY PRESCRIBER/CLIN PHARMACIST DOCUMENTED: ICD-10-PCS | Mod: CPTII,S$GLB,, | Performed by: INTERNAL MEDICINE

## 2022-05-09 PROCEDURE — 1160F RVW MEDS BY RX/DR IN RCRD: CPT | Mod: CPTII,S$GLB,, | Performed by: INTERNAL MEDICINE

## 2022-05-09 PROCEDURE — 3008F BODY MASS INDEX DOCD: CPT | Mod: CPTII,S$GLB,, | Performed by: INTERNAL MEDICINE

## 2022-05-09 PROCEDURE — 99499 UNLISTED E&M SERVICE: CPT | Mod: S$GLB,,, | Performed by: INTERNAL MEDICINE

## 2022-05-09 PROCEDURE — 3078F DIAST BP <80 MM HG: CPT | Mod: CPTII,S$GLB,, | Performed by: INTERNAL MEDICINE

## 2022-05-09 PROCEDURE — 3008F PR BODY MASS INDEX (BMI) DOCUMENTED: ICD-10-PCS | Mod: CPTII,S$GLB,, | Performed by: INTERNAL MEDICINE

## 2022-05-09 PROCEDURE — 99214 OFFICE O/P EST MOD 30 MIN: CPT | Mod: S$GLB,,, | Performed by: INTERNAL MEDICINE

## 2022-05-09 PROCEDURE — 99999 PR PBB SHADOW E&M-EST. PATIENT-LVL V: CPT | Mod: PBBFAC,,, | Performed by: INTERNAL MEDICINE

## 2022-05-09 PROCEDURE — 1126F PR PAIN SEVERITY QUANTIFIED, NO PAIN PRESENT: ICD-10-PCS | Mod: CPTII,S$GLB,, | Performed by: INTERNAL MEDICINE

## 2022-05-09 NOTE — PROGRESS NOTES
[facility] Note    Chief Complaint      Chief Complaint   Patient presents with    Follow-up     6 month f/u       History of Present Illness      Geraldine Longoria is a 71 y.o. female with chronic conditions of DM2, HTN, HLD, hypothyroidism, AVNRT who presents today for: follow up chronic conditions.    DM2: A1C 6.5 last check.  Not on meds.  Eye exam UTD 5/2021 with DR. Ma.  HTN: BP at goal on chlorthalidone    HLD: , down from 166.    The 10-year ASCVD risk score (Magalijacob SERRANO JrKirstie, et al., 2013) is: 22.1%    Values used to calculate the score:      Age: 71 years      Sex: Female      Is Non- : No      Diabetic: Yes      Tobacco smoker: No      Systolic Blood Pressure: 124 mmHg      Is BP treated: Yes      HDL Cholesterol: 72 mg/dL      Total Cholesterol: 256 mg/dL    Hypothyroidism: Controlled on synthroid.  TSH at goal last check.  AVNRT: Previously on metoprolol to control.  Has seen Cardiology previously.  Flu shot declines.  Td 2007, declines.  Pneumovax, prevnar declines.  Shingrix declines. COVID vaccine UTD  Mammogram 12/2020.  FIT 6/2021 negative.  Due for cscope.    Past Medical History:  Past Medical History:   Diagnosis Date    Thyroid disease        Past Surgical History:   has a past surgical history that includes Kidney surgery and Breast surgery (march 1994).    Family History:  family history includes Arthritis in her mother; Cancer in her paternal aunt; Diabetes in her sister; Hypertension in her sister.     Social History:  Social History     Tobacco Use    Smoking status: Never Smoker    Smokeless tobacco: Never Used   Substance Use Topics    Alcohol use: Never    Drug use: Never       I personally reviewed all past medical, surgical, social and family history.    Review of Systems   Constitutional: Negative for chills, fever and malaise/fatigue.   Respiratory: Negative for shortness of breath.    Cardiovascular: Negative for chest pain.  "  Gastrointestinal: Negative for constipation, diarrhea, nausea and vomiting.   Skin: Negative for rash.   Neurological: Negative for weakness.   All other systems reviewed and are negative.       Medications:  Outpatient Encounter Medications as of 5/9/2022   Medication Sig Dispense Refill    chlorthalidone (HYGROTEN) 25 MG Tab Take 1 tablet (25 mg total) by mouth once daily. 90 tablet 3    levothyroxine (SYNTHROID) 88 MCG tablet Take 1 tablet (88 mcg total) by mouth once daily. 90 tablet 3    linaCLOtide (LINZESS) 145 mcg Cap capsule Take 1 capsule (145 mcg total) by mouth before breakfast. 90 capsule 3     No facility-administered encounter medications on file as of 5/9/2022.       Allergies:  Review of patient's allergies indicates:  No Known Allergies    Health Maintenance:  Immunization History   Administered Date(s) Administered    COVID-19, MRNA, LN-S, PF (Pfizer) (Purple Cap) 04/16/2021, 05/07/2021, 12/07/2021    Td (ADULT) 10/19/2007      Health Maintenance   Topic Date Due    Hepatitis C Screening  Never done    Foot Exam  Never done    Low Dose Statin  Never done    DEXA Scan  Never done    TETANUS VACCINE  10/19/2017    Mammogram  12/01/2021    Hemoglobin A1c  02/04/2022    Eye Exam  06/25/2022    Lipid Panel  08/04/2022        Physical Exam      Vital Signs  Pulse: 83  SpO2: 99 %  BP: 124/70  BP Location: Left arm  Patient Position: Sitting  Pain Score: 0-No pain  Height and Weight  Height: 5' 5" (165.1 cm)  Weight: 69.7 kg (153 lb 10.6 oz)  BSA (Calculated - sq m): 1.79 sq meters  BMI (Calculated): 25.6  Weight in (lb) to have BMI = 25: 149.9]    Physical Exam  Vitals reviewed.   Constitutional:       Appearance: She is well-developed.   HENT:      Head: Normocephalic and atraumatic.      Right Ear: External ear normal.      Left Ear: External ear normal.   Cardiovascular:      Rate and Rhythm: Normal rate and regular rhythm.      Heart sounds: Normal heart sounds. No murmur " heard.  Pulmonary:      Effort: Pulmonary effort is normal.      Breath sounds: Normal breath sounds. No wheezing or rales.   Abdominal:      General: Bowel sounds are normal. There is no distension.      Palpations: Abdomen is soft.      Tenderness: There is no abdominal tenderness.          Laboratory:  CBC:  Recent Labs   Lab 07/24/20 0818 08/04/21 0715   WBC 5.4 6.53   RBC 4.45 4.69   Hemoglobin 12.6 13.3   Hematocrit 37.0 40.7   Platelets 224 319   MCV 83.1 87   MCH 28.4 28.4   MCHC 34.1 32.7     CMP:  Recent Labs   Lab 07/24/20  0818 07/24/20  0818 02/10/21  1535 08/04/21  0715   Glucose 114 H  --  164 H 119 H   Calcium 10.2  --  10.6 H 10.5   Albumin  --    < > 4.4 4.1   ALBUMIN 4.1  --   --   --    Total Protein 6.6   < > 7.8 7.3   Sodium 138  --  141 137   Potassium 4.0  --  3.6 3.0 L   CO2 27  --  32 H 32 H   Chloride 100  --  98 97   BUN 8   < > 12 11   Alkaline Phosphatase 82  --  110 90   ALT <10  --  8 L 7 L   AST 23  --  34 30   Total Bilirubin 0.4  --  0.5 0.6    < > = values in this interval not displayed.     URINALYSIS:       LIPIDS:  Recent Labs   Lab 07/24/20  0818 08/04/21  0715   TSH 1.12 2.550   HDL 71 72   Cholesterol 252 H 256 H   Triglycerides 95 109   LDL Calculated 166 H  --    LDL Cholesterol  --  162.2 H   HDL/Cholesterol Ratio  --  28.1   Non-HDL Cholesterol 181 H 184   Total Cholesterol/HDL Ratio  --  3.6     TSH:  Recent Labs   Lab 07/24/20  0818 08/04/21  0715   TSH 1.12 2.550     A1C:  Recent Labs   Lab 07/24/20 0818 02/10/21  1535 08/04/21  0715   Hemoglobin A1C 6.4 H 6.5 H 6.5 H       Assessment/Plan     Geraldine Longoria is a 71 y.o.female with:    1. Type 2 diabetes mellitus without complication, without long-term current use of insulin  - Comprehensive Metabolic Panel; Future  - Hemoglobin A1C; Future  - Ambulatory referral/consult to Ophthalmology; Future  Cont diet control.  Update labs.  Eye exam referred  2. Essential hypertension  Continue current meds.    3.  Hyperlipidemia, mixed  - Lipid Panel; Future  Continue current meds.  Update labs  4. Hypothyroidism (acquired)  Continue current meds.    5. AVNRT (AV aftab re-entry tachycardia)  F/U with cardiology  6. GERD without esophagitis  - Ambulatory referral/consult to Gastroenterology; Future  Referred to GI  7. Screening mammogram, encounter for  - Mammo Digital Screening Bilat w/ Tirso; Future  - Mammo Digital Screening Bilat w/ Tirso    8. Postmenopausal  - DXA Bone Density Spine And Hip; Future  - DXA Bone Density Spine And Hip       Chronic conditions status updated as per HPI.  Other than changes above, cont current medications and maintain follow up with specialists.  Follow up in about 6 months (around 11/9/2022) for Follow up visit.    No future appointments.    Brett Garrison MD  [facility]

## 2022-05-11 LAB
ALBUMIN: 4.3 G/DL (ref 3.5–5)
ALP SERPL-CCNC: 105 U/L (ref 38–126)
ALT SERPL W P-5'-P-CCNC: 7 U/L (ref 7–56)
ANION GAP SERPL CALC-SCNC: 13.4 MMOL/L (ref 9–18)
AST SERPL-CCNC: 18 U/L (ref 7–40)
BILIRUB SERPL-MCNC: 0.6 MG/DL (ref 0–1.2)
BUN BLD-MCNC: 12 MG/DL (ref 7–21)
BUN/CREAT SERPL: 14 (ref 6–22)
CALC OSMOLALITY: 281 MOSM/KG (ref 275–295)
CALCIUM SERPL-MCNC: 10.7 MG/DL (ref 8.5–10.3)
CHLORIDE SERPL-SCNC: 100 MMOL/L (ref 98–107)
CHOL/HDLC RATIO: 3.35
CHOLEST SERPL-MSCNC: 241 MG/DL (ref 100–200)
CO2 SERPL-SCNC: 30 MMOL/L (ref 21–31)
CREAT SERPL-MCNC: 0.87 MG/DL (ref 0.5–1)
EGFR: 78 ML/MIN
GFR: 67 ML/MIN
GLUCOSE SERPL-MCNC: 128 MG/DL (ref 70–100)
HBA1C MFR BLD: 6.6 % (ref 4.5–5.7)
HDLC SERPL-MCNC: 72 MG/DL (ref 40–75)
LDLC SERPL CALC-MCNC: 150 MG/DL (ref 0–125)
POTASSIUM SERPL-SCNC: 3.4 MMOL/L (ref 3.5–5)
SODIUM BLD-SCNC: 140 MMOL/L (ref 135–145)
TOTAL PROTEIN: 6.8 G/DL (ref 6.3–8.2)
TRIGL SERPL-MCNC: 121 MG/DL (ref 30–150)

## 2022-05-23 ENCOUNTER — PATIENT OUTREACH (OUTPATIENT)
Dept: ADMINISTRATIVE | Facility: HOSPITAL | Age: 72
End: 2022-05-23
Payer: MEDICARE

## 2022-05-23 NOTE — PROGRESS NOTES
Health Maintenance Due   Topic Date Due    Hepatitis C Screening  Never done    Foot Exam  Never done    Low Dose Statin  Never done    Shingles Vaccine (1 of 2) Never done    Pneumococcal Vaccines (Age 65+) (1 - PCV) Never done    TETANUS VACCINE  10/19/2017    COVID-19 Vaccine (4 - Booster for Pfizer series) 04/07/2022     Triggered LINKS. Updated Care Everywhere. Scanned and hyperlinked outside mammography & DEXA scan results into . Chart review completed.

## 2022-08-22 ENCOUNTER — PATIENT OUTREACH (OUTPATIENT)
Dept: ADMINISTRATIVE | Facility: HOSPITAL | Age: 72
End: 2022-08-22
Payer: MEDICARE

## 2022-08-22 NOTE — PROGRESS NOTES
Patient has appointment 11/9/2022. Note placed to discuss CRS ([facility] CRS Report).

## 2022-08-29 LAB — FIT SCREENING: NEGATIVE

## 2022-09-14 LAB — CRC RECOMMENDATION EXT: NORMAL

## 2022-09-16 ENCOUNTER — PATIENT OUTREACH (OUTPATIENT)
Dept: ADMINISTRATIVE | Facility: HOSPITAL | Age: 72
End: 2022-09-16
Payer: MEDICARE

## 2022-09-16 NOTE — PROGRESS NOTES
Health Maintenance Due   Topic Date Due    Hepatitis C Screening  Never done    Pneumococcal Vaccines (Age 65+) (1 - PCV) Never done    Foot Exam  Never done    Low Dose Statin  Never done    Shingles Vaccine (1 of 2) Never done    TETANUS VACCINE  10/19/2017    COVID-19 Vaccine (4 - Booster for Pfizer series) 04/07/2022    Influenza Vaccine (1) Never done    Diabetes Urine Screening  08/04/2022     Chart review done. HM updated. Immunizations reviewed & updated. Care Everywhere updated.  Colonoscopy from 9/14/22 scanned into chart.

## 2022-10-04 ENCOUNTER — PATIENT OUTREACH (OUTPATIENT)
Dept: ADMINISTRATIVE | Facility: HOSPITAL | Age: 72
End: 2022-10-04
Payer: MEDICARE

## 2022-10-04 NOTE — PROGRESS NOTES
Health Maintenance Due   Topic Date Due    Hepatitis C Screening  Never done    Pneumococcal Vaccines (Age 65+) (1 - PCV) Never done    Foot Exam  Never done    Low Dose Statin  Never done    Shingles Vaccine (1 of 2) Never done    TETANUS VACCINE  10/19/2017    COVID-19 Vaccine (4 - Booster for Pfizer series) 02/01/2022    Diabetes Urine Screening  08/04/2022    Influenza Vaccine (1) Never done     Chart review done. HM updated. Immunizations reviewed & updated. Care Everywhere updated.  FIT KIT from 08/29/22 scanned into chart.

## 2022-10-06 DIAGNOSIS — E11.9 TYPE 2 DIABETES MELLITUS WITHOUT COMPLICATION: ICD-10-CM

## 2022-10-10 ENCOUNTER — PATIENT MESSAGE (OUTPATIENT)
Dept: ADMINISTRATIVE | Facility: HOSPITAL | Age: 72
End: 2022-10-10
Payer: MEDICARE

## 2022-11-09 ENCOUNTER — OFFICE VISIT (OUTPATIENT)
Dept: INTERNAL MEDICINE | Facility: CLINIC | Age: 72
End: 2022-11-09
Payer: MEDICARE

## 2022-11-09 VITALS
WEIGHT: 156.44 LBS | BODY MASS INDEX: 26.06 KG/M2 | HEIGHT: 65 IN | DIASTOLIC BLOOD PRESSURE: 70 MMHG | OXYGEN SATURATION: 99 % | HEART RATE: 86 BPM | SYSTOLIC BLOOD PRESSURE: 120 MMHG

## 2022-11-09 DIAGNOSIS — M79.671 RIGHT FOOT PAIN: ICD-10-CM

## 2022-11-09 DIAGNOSIS — M35.00 SICCA, UNSPECIFIED TYPE: ICD-10-CM

## 2022-11-09 DIAGNOSIS — M54.2 CHRONIC NECK PAIN: Primary | ICD-10-CM

## 2022-11-09 DIAGNOSIS — E11.9 TYPE 2 DIABETES MELLITUS WITHOUT COMPLICATION, WITHOUT LONG-TERM CURRENT USE OF INSULIN: ICD-10-CM

## 2022-11-09 DIAGNOSIS — Z00.00 ANNUAL PHYSICAL EXAM: Primary | ICD-10-CM

## 2022-11-09 DIAGNOSIS — M54.2 CHRONIC NECK PAIN: ICD-10-CM

## 2022-11-09 DIAGNOSIS — G89.29 CHRONIC NECK PAIN: ICD-10-CM

## 2022-11-09 DIAGNOSIS — G44.209 TENSION HEADACHE: ICD-10-CM

## 2022-11-09 DIAGNOSIS — E78.2 HYPERLIPIDEMIA, MIXED: ICD-10-CM

## 2022-11-09 DIAGNOSIS — G89.29 CHRONIC NECK PAIN: Primary | ICD-10-CM

## 2022-11-09 DIAGNOSIS — I47.10 SVT (SUPRAVENTRICULAR TACHYCARDIA): ICD-10-CM

## 2022-11-09 DIAGNOSIS — E03.9 HYPOTHYROIDISM (ACQUIRED): ICD-10-CM

## 2022-11-09 DIAGNOSIS — I10 ESSENTIAL HYPERTENSION: ICD-10-CM

## 2022-11-09 PROCEDURE — 99397 PR PREVENTIVE VISIT,EST,65 & OVER: ICD-10-PCS | Mod: S$GLB,,, | Performed by: INTERNAL MEDICINE

## 2022-11-09 PROCEDURE — 3078F PR MOST RECENT DIASTOLIC BLOOD PRESSURE < 80 MM HG: ICD-10-PCS | Mod: CPTII,S$GLB,, | Performed by: INTERNAL MEDICINE

## 2022-11-09 PROCEDURE — 3044F HG A1C LEVEL LT 7.0%: CPT | Mod: CPTII,S$GLB,, | Performed by: INTERNAL MEDICINE

## 2022-11-09 PROCEDURE — 99397 PER PM REEVAL EST PAT 65+ YR: CPT | Mod: S$GLB,,, | Performed by: INTERNAL MEDICINE

## 2022-11-09 PROCEDURE — 99999 PR PBB SHADOW E&M-EST. PATIENT-LVL IV: ICD-10-PCS | Mod: PBBFAC,,, | Performed by: INTERNAL MEDICINE

## 2022-11-09 PROCEDURE — 3074F SYST BP LT 130 MM HG: CPT | Mod: CPTII,S$GLB,, | Performed by: INTERNAL MEDICINE

## 2022-11-09 PROCEDURE — 1126F PR PAIN SEVERITY QUANTIFIED, NO PAIN PRESENT: ICD-10-PCS | Mod: CPTII,S$GLB,, | Performed by: INTERNAL MEDICINE

## 2022-11-09 PROCEDURE — 3044F PR MOST RECENT HEMOGLOBIN A1C LEVEL <7.0%: ICD-10-PCS | Mod: CPTII,S$GLB,, | Performed by: INTERNAL MEDICINE

## 2022-11-09 PROCEDURE — 99999 PR PBB SHADOW E&M-EST. PATIENT-LVL IV: CPT | Mod: PBBFAC,,, | Performed by: INTERNAL MEDICINE

## 2022-11-09 PROCEDURE — 3008F PR BODY MASS INDEX (BMI) DOCUMENTED: ICD-10-PCS | Mod: CPTII,S$GLB,, | Performed by: INTERNAL MEDICINE

## 2022-11-09 PROCEDURE — 1159F PR MEDICATION LIST DOCUMENTED IN MEDICAL RECORD: ICD-10-PCS | Mod: CPTII,S$GLB,, | Performed by: INTERNAL MEDICINE

## 2022-11-09 PROCEDURE — 3074F PR MOST RECENT SYSTOLIC BLOOD PRESSURE < 130 MM HG: ICD-10-PCS | Mod: CPTII,S$GLB,, | Performed by: INTERNAL MEDICINE

## 2022-11-09 PROCEDURE — 1126F AMNT PAIN NOTED NONE PRSNT: CPT | Mod: CPTII,S$GLB,, | Performed by: INTERNAL MEDICINE

## 2022-11-09 PROCEDURE — 1159F MED LIST DOCD IN RCRD: CPT | Mod: CPTII,S$GLB,, | Performed by: INTERNAL MEDICINE

## 2022-11-09 PROCEDURE — 3078F DIAST BP <80 MM HG: CPT | Mod: CPTII,S$GLB,, | Performed by: INTERNAL MEDICINE

## 2022-11-09 PROCEDURE — 3008F BODY MASS INDEX DOCD: CPT | Mod: CPTII,S$GLB,, | Performed by: INTERNAL MEDICINE

## 2022-11-09 RX ORDER — TRAMADOL HYDROCHLORIDE 50 MG/1
50 TABLET ORAL EVERY 12 HOURS PRN
Qty: 30 TABLET | Refills: 0 | Status: SHIPPED | OUTPATIENT
Start: 2022-11-09

## 2022-11-09 NOTE — PROGRESS NOTES
[facility] Note    Chief Complaint      Chief Complaint   Patient presents with    Diabetes     6 MONTH F/U       History of Present Illness      Geraldine Longoria is a 72 y.o. female with chronic conditions of DM2, HTN, HLD, hypothyroidism, AVNRT who presents today for: follow up chronic conditions.  Complains of bilateral neck and posterior scalp pain for months.  Active with yoga stretches.    DM2: A1C 6.5 last check.  Not on meds.  Eye exam UTD 3/2022 with DR. Ndiaye.  HTN: BP at goal on chlorthalidone    HLD:  last check.  Hypothyroidism: Controlled on synthroid.  TSH at goal last check.  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  Has seen Cardiology previously.  Flu shot declines.  Td 2007, declines.  Pneumovax, prevnar declines.  Shingrix declines. COVID vaccine UTD  Mammogram 5/2022.  FIT 6/2021 negative.  Cscope 2022, Dr. Patton, no polyps, 10 yr interval.     Past Medical History:  Past Medical History:   Diagnosis Date    Thyroid disease        Past Surgical History:   has a past surgical history that includes Kidney surgery and Breast surgery (march 1994).    Family History:  family history includes Arthritis in her mother; Cancer in her paternal aunt; Diabetes in her sister; Hypertension in her sister.     Social History:  Social History     Tobacco Use    Smoking status: Never    Smokeless tobacco: Never   Substance Use Topics    Alcohol use: Never    Drug use: Never       I personally reviewed all past medical, surgical, social and family history.    Review of Systems   Constitutional:  Negative for chills, fever and malaise/fatigue.   Respiratory:  Negative for shortness of breath.    Cardiovascular:  Negative for chest pain.   Gastrointestinal:  Negative for constipation, diarrhea, nausea and vomiting.   Skin:  Negative for rash.   Neurological:  Negative for weakness.   All other systems reviewed and are negative.     Medications:  Outpatient Encounter Medications as of  "11/9/2022   Medication Sig Dispense Refill    chlorthalidone (HYGROTEN) 25 MG Tab TAKE 1 TABLET EVERY DAY 90 tablet 3    levothyroxine (SYNTHROID) 88 MCG tablet TAKE 1 TABLET EVERY DAY 90 tablet 3    linaCLOtide (LINZESS) 145 mcg Cap capsule Take 1 capsule (145 mcg total) by mouth before breakfast. 90 capsule 3     No facility-administered encounter medications on file as of 11/9/2022.       Allergies:  Review of patient's allergies indicates:  No Known Allergies    Health Maintenance:  Immunization History   Administered Date(s) Administered    COVID-19, MRNA, LN-S, PF (Pfizer) (Purple Cap) 04/16/2021, 05/07/2021, 12/07/2021    Td (ADULT) 10/19/2007      Health Maintenance   Topic Date Due    Hepatitis C Screening  Never done    Foot Exam  Never done    Low Dose Statin  Never done    TETANUS VACCINE  10/19/2017    Hemoglobin A1c  11/11/2022    Eye Exam  03/15/2023    Lipid Panel  05/11/2023    Mammogram  05/18/2023    DEXA Scan  05/18/2025        Physical Exam      Vital Signs  Pulse: 86  SpO2: 99 %  BP: 120/70  BP Location: Left arm  Patient Position: Sitting  Pain Score: 0-No pain  Height and Weight  Height: 5' 5" (165.1 cm)  Weight: 70.9 kg (156 lb 6.7 oz)  BSA (Calculated - sq m): 1.8 sq meters  BMI (Calculated): 26  Weight in (lb) to have BMI = 25: 149.9]    Physical Exam  Vitals reviewed.   Constitutional:       Appearance: She is well-developed.   HENT:      Head: Normocephalic and atraumatic.      Right Ear: External ear normal.      Left Ear: External ear normal.   Cardiovascular:      Rate and Rhythm: Normal rate and regular rhythm.      Heart sounds: Normal heart sounds. No murmur heard.  Pulmonary:      Effort: Pulmonary effort is normal.      Breath sounds: Normal breath sounds. No wheezing or rales.   Abdominal:      General: Bowel sounds are normal. There is no distension.      Palpations: Abdomen is soft.      Tenderness: There is no abdominal tenderness.        Laboratory:  CBC:  Recent Labs   Lab " 07/24/20  0818 08/04/21  0715   WBC 5.4 6.53   RBC 4.45 4.69   Hemoglobin 12.6 13.3   Hematocrit 37.0 40.7   Platelets 224 319   MCV 83.1 87   MCH 28.4 28.4   MCHC 34.1 32.7     CMP:  Recent Labs   Lab 02/10/21  1535 08/04/21  0715 05/11/22  0749   Glucose 164 H 119 H 128 H   Calcium 10.6 H 10.5 10.7 H   Albumin 4.4 4.1  --    ALBUMIN  --   --  4.3   Total Protein 7.8 7.3 6.8   Sodium 141 137 140   Potassium 3.6 3.0 L 3.4 L   CO2 32 H 32 H 30   Chloride 98 97 100   BUN 12 11 12.0   Alkaline Phosphatase 110 90 105   ALT 8 L 7 L 7   AST 34 30 18   Total Bilirubin 0.5 0.6 0.6     URINALYSIS:       LIPIDS:  Recent Labs   Lab 07/24/20  0818 08/04/21  0715 05/11/22  0749   TSH 1.12 2.550  --    HDL 71 72 72   Cholesterol 252 H 256 H 241 H   Triglycerides 95 109 121   LDL Calculated 166 H  --  150 H   LDL Cholesterol  --  162.2 H  --    HDL/Cholesterol Ratio  --  28.1  --    Non-HDL Cholesterol 181 H 184  --    Total Cholesterol/HDL Ratio  --  3.6  --      TSH:  Recent Labs   Lab 07/24/20  0818 08/04/21  0715   TSH 1.12 2.550     A1C:  Recent Labs   Lab 07/24/20  0818 02/10/21  1535 08/04/21  0715 05/11/22  0749   Hemoglobin A1C 6.4 H 6.5 H 6.5 H 6.6 H       Assessment/Plan     Geraldine Longoria is a 72 y.o.female with:    1. Annual physical exam  Discussed diet and exercise, vaccines and cancer screening, risk factors.  Screening labs ordered.     2. Type 2 diabetes mellitus without complication, without long-term current use of insulin  - CBC Auto Differential; Future  - Comprehensive Metabolic Panel; Future  - Hemoglobin A1C; Future  - Microalbumin/Creatinine Ratio, Urine; Future  - CBC Auto Differential  - Comprehensive Metabolic Panel  - Hemoglobin A1C  - Microalbumin/Creatinine Ratio, Urine  Continue current meds.  Update labs.  Eye exam UTD.  3. Essential hypertension  Continue current meds.    4. Hyperlipidemia, mixed  - Comprehensive Metabolic Panel; Future  - Lipid Panel; Future  - Comprehensive Metabolic Panel  -  Lipid Panel  Counseled on diet and exercise.  Consider CT calcium score to further risk stratify  5. Hypothyroidism (acquired)  - CBC Auto Differential; Future  - TSH; Future  - T4, Free; Future  - CBC Auto Differential  - TSH  - T4, Free  Continue current meds.    6. SVT (supraventricular tachycardia)  - CBC Auto Differential; Future  - CBC Auto Differential  Stable.  7. Sicca, unspecified type  Possible sero-negative Sjogren's syndrome.  Managing conservatively with symptom control  8. Right foot pain  - Ambulatory referral/consult to Podiatry; Future   Refer to podiatry for evaluation.    Chronic conditions status updated as per HPI.  Other than changes above, cont current medications and maintain follow up with specialists.  No follow-ups on file.    No future appointments.    Brett Garrison MD  [facility]

## 2022-11-09 NOTE — TELEPHONE ENCOUNTER
----- Message from Maribeth Johnson sent at 11/9/2022  3:32 PM CST -----  Contact: NYDIA JENSEN [[id number]] @ [phone]  Patient had a message from a  Dr Garcia about her Tramadol. Did your office call her? Please call patient.

## 2022-11-09 NOTE — TELEPHONE ENCOUNTER
----- Message from Maribeth Johnson sent at 11/9/2022  3:32 PM CST -----  Contact: NYDIA JENSEN [[id number]] @ [phone]  Patient had a message from a  Dr Garcia about her Tramadol. Did your office call her? Please call patient.

## 2022-11-09 NOTE — TELEPHONE ENCOUNTER
No new care gaps identified.  [facility] Embedded Care Gaps. Reference number: [id number]. 11/09/2022   4:00:20 PM CST

## 2022-11-09 NOTE — TELEPHONE ENCOUNTER
----- Message from Gissell Raman sent at 11/9/2022  2:52 PM CST -----  Contact: [phone]  Pt called to get a status update on a medication she requested. Please Advise

## 2022-11-11 ENCOUNTER — TELEPHONE (OUTPATIENT)
Dept: INTERNAL MEDICINE | Facility: CLINIC | Age: 72
End: 2022-11-11
Payer: MEDICARE

## 2022-11-11 NOTE — TELEPHONE ENCOUNTER
----- Message from Maribeth Johnson sent at 11/11/2022 11:33 AM CST -----  Contact: NYDIA JENSEN [[id number]]@[phone]  Patient is returning a phone call.  Who left a message for the patient: Adamaris STONE  Does patient know what this is regarding:  No  Would you like a call back, or a response through your [facility] portal?:   Call back   Comments:

## 2022-11-11 NOTE — TELEPHONE ENCOUNTER
----- Message from Yi Henry sent at 11/11/2022 11:12 AM CST -----  Pt is calling in regards if they are still good if she delays for 2 weeks. Please call and advise

## 2022-11-30 LAB
ALBUMIN SERPL-MCNC: 4.1 G/DL (ref 3.6–5.1)
ALBUMIN/CREAT UR: NORMAL MCG/MG CREAT
ALBUMIN/GLOB SERPL: 1.5 (CALC) (ref 1–2.5)
ALP SERPL-CCNC: 93 U/L (ref 37–153)
ALT SERPL-CCNC: 6 U/L (ref 6–29)
AST SERPL-CCNC: 20 U/L (ref 10–35)
BASOPHILS # BLD AUTO: 70 CELLS/UL (ref 0–200)
BASOPHILS NFR BLD AUTO: 1.2 %
BILIRUB SERPL-MCNC: 0.6 MG/DL (ref 0.2–1.2)
BUN SERPL-MCNC: 8 MG/DL (ref 7–25)
BUN/CREAT SERPL: ABNORMAL (CALC) (ref 6–22)
CALCIUM SERPL-MCNC: 10.1 MG/DL (ref 8.6–10.4)
CHLORIDE SERPL-SCNC: 96 MMOL/L (ref 98–110)
CHOLEST SERPL-MCNC: 257 MG/DL
CHOLEST/HDLC SERPL: 3.3 (CALC)
CO2 SERPL-SCNC: 32 MMOL/L (ref 20–32)
CREAT SERPL-MCNC: 0.78 MG/DL (ref 0.6–1)
CREAT UR-MCNC: 26 MG/DL (ref 20–275)
EGFR: 81 ML/MIN/1.73M2
EOSINOPHIL # BLD AUTO: 220 CELLS/UL (ref 15–500)
EOSINOPHIL NFR BLD AUTO: 3.8 %
ERYTHROCYTE [DISTWIDTH] IN BLOOD BY AUTOMATED COUNT: 12.7 % (ref 11–15)
GLOBULIN SER CALC-MCNC: 2.8 G/DL (CALC) (ref 1.9–3.7)
GLUCOSE SERPL-MCNC: 111 MG/DL (ref 65–99)
HBA1C MFR BLD: 6.9 % OF TOTAL HGB
HCT VFR BLD AUTO: 38.9 % (ref 35–45)
HDLC SERPL-MCNC: 78 MG/DL
HGB BLD-MCNC: 12.8 G/DL (ref 11.7–15.5)
LDLC SERPL CALC-MCNC: 160 MG/DL (CALC)
LYMPHOCYTES # BLD AUTO: 1641 CELLS/UL (ref 850–3900)
LYMPHOCYTES NFR BLD AUTO: 28.3 %
MCH RBC QN AUTO: 28.1 PG (ref 27–33)
MCHC RBC AUTO-ENTMCNC: 32.9 G/DL (ref 32–36)
MCV RBC AUTO: 85.3 FL (ref 80–100)
MICROALBUMIN UR-MCNC: <0.2 MG/DL
MONOCYTES # BLD AUTO: 534 CELLS/UL (ref 200–950)
MONOCYTES NFR BLD AUTO: 9.2 %
NEUTROPHILS # BLD AUTO: 3335 CELLS/UL (ref 1500–7800)
NEUTROPHILS NFR BLD AUTO: 57.5 %
NONHDLC SERPL-MCNC: 179 MG/DL (CALC)
PLATELET # BLD AUTO: 271 THOUSAND/UL (ref 140–400)
PMV BLD REES-ECKER: 10.5 FL (ref 7.5–12.5)
POTASSIUM SERPL-SCNC: 3.5 MMOL/L (ref 3.5–5.3)
PROT SERPL-MCNC: 6.9 G/DL (ref 6.1–8.1)
RBC # BLD AUTO: 4.56 MILLION/UL (ref 3.8–5.1)
SODIUM SERPL-SCNC: 135 MMOL/L (ref 135–146)
T4 FREE SERPL-MCNC: 1.5 NG/DL (ref 0.8–1.8)
TRIGL SERPL-MCNC: 89 MG/DL
TSH SERPL-ACNC: 0.43 MIU/L (ref 0.4–4.5)
WBC # BLD AUTO: 5.8 THOUSAND/UL (ref 3.8–10.8)

## 2022-11-30 NOTE — PROGRESS NOTES
Labs look good except cholesterol and blood sugar control have slightly worsened since last check.  Recommend to refocus low fat, low carb diet and recheck with next routine labs.

## 2022-12-21 ENCOUNTER — TELEPHONE (OUTPATIENT)
Dept: INTERNAL MEDICINE | Facility: CLINIC | Age: 72
End: 2022-12-21
Payer: MEDICARE

## 2022-12-21 DIAGNOSIS — H04.123 DRY EYES, BILATERAL: Primary | ICD-10-CM

## 2022-12-21 NOTE — TELEPHONE ENCOUNTER
Called and relayed message from PCP. Pt requesting an external referral to Dr Braxton Adorno. Will pend external referral.

## 2022-12-21 NOTE — TELEPHONE ENCOUNTER
Discussed symptoms of dry eyes with Dr. Ndiaye.  He is requesting rheumatology evaluate the patient for possible autoimmune disease. Referral placed to rheumatology.  Please assist with scheduling.

## 2023-01-06 ENCOUNTER — TELEPHONE (OUTPATIENT)
Dept: INTERNAL MEDICINE | Facility: CLINIC | Age: 73
End: 2023-01-06
Payer: MEDICARE

## 2023-01-06 NOTE — TELEPHONE ENCOUNTER
----- Message from Annie Conroy sent at 1/6/2023  9:35 AM CST -----  Contact: [phone]  [facility] stated that they are not able to see the referral through [facility], if that can be sent to them with the  Last two visit summary and the last two blood work.  Dr Champion Fax # [phone]. Ph # [phone]. Without those documentation patient wont be able to scheduled an appointment.  Please call and advise. Thank you

## 2023-02-07 DIAGNOSIS — Z00.00 ENCOUNTER FOR MEDICARE ANNUAL WELLNESS EXAM: ICD-10-CM

## 2023-02-09 DIAGNOSIS — Z00.00 ENCOUNTER FOR MEDICARE ANNUAL WELLNESS EXAM: ICD-10-CM

## 2023-04-03 ENCOUNTER — PATIENT MESSAGE (OUTPATIENT)
Dept: ADMINISTRATIVE | Facility: HOSPITAL | Age: 73
End: 2023-04-03
Payer: MEDICARE

## 2023-04-17 ENCOUNTER — OFFICE VISIT (OUTPATIENT)
Dept: PRIMARY CARE CLINIC | Facility: CLINIC | Age: 73
End: 2023-04-17
Payer: MEDICARE

## 2023-04-17 VITALS
TEMPERATURE: 98 F | HEART RATE: 87 BPM | DIASTOLIC BLOOD PRESSURE: 60 MMHG | WEIGHT: 164 LBS | OXYGEN SATURATION: 98 % | BODY MASS INDEX: 27.29 KG/M2 | SYSTOLIC BLOOD PRESSURE: 122 MMHG | RESPIRATION RATE: 16 BRPM

## 2023-04-17 DIAGNOSIS — I10 ESSENTIAL HYPERTENSION: ICD-10-CM

## 2023-04-17 DIAGNOSIS — H26.9 CATARACT, UNSPECIFIED CATARACT TYPE, UNSPECIFIED LATERALITY: ICD-10-CM

## 2023-04-17 DIAGNOSIS — Z01.818 PRE-OP EXAMINATION: Primary | ICD-10-CM

## 2023-04-17 DIAGNOSIS — E11.9 TYPE 2 DIABETES MELLITUS WITHOUT COMPLICATION, WITHOUT LONG-TERM CURRENT USE OF INSULIN: ICD-10-CM

## 2023-04-17 PROCEDURE — 99999 PR PBB SHADOW E&M-EST. PATIENT-LVL III: CPT | Mod: PBBFAC,,, | Performed by: NURSE PRACTITIONER

## 2023-04-17 PROCEDURE — 99214 OFFICE O/P EST MOD 30 MIN: CPT | Mod: S$GLB,,, | Performed by: NURSE PRACTITIONER

## 2023-04-17 PROCEDURE — 99999 PR PBB SHADOW E&M-EST. PATIENT-LVL III: ICD-10-PCS | Mod: PBBFAC,,, | Performed by: NURSE PRACTITIONER

## 2023-04-17 PROCEDURE — 3078F DIAST BP <80 MM HG: CPT | Mod: CPTII,S$GLB,, | Performed by: NURSE PRACTITIONER

## 2023-04-17 PROCEDURE — 3078F PR MOST RECENT DIASTOLIC BLOOD PRESSURE < 80 MM HG: ICD-10-PCS | Mod: CPTII,S$GLB,, | Performed by: NURSE PRACTITIONER

## 2023-04-17 PROCEDURE — 3074F SYST BP LT 130 MM HG: CPT | Mod: CPTII,S$GLB,, | Performed by: NURSE PRACTITIONER

## 2023-04-17 PROCEDURE — 3008F PR BODY MASS INDEX (BMI) DOCUMENTED: ICD-10-PCS | Mod: CPTII,S$GLB,, | Performed by: NURSE PRACTITIONER

## 2023-04-17 PROCEDURE — 99214 PR OFFICE/OUTPT VISIT, EST, LEVL IV, 30-39 MIN: ICD-10-PCS | Mod: S$GLB,,, | Performed by: NURSE PRACTITIONER

## 2023-04-17 PROCEDURE — 1159F MED LIST DOCD IN RCRD: CPT | Mod: CPTII,S$GLB,, | Performed by: NURSE PRACTITIONER

## 2023-04-17 PROCEDURE — 3074F PR MOST RECENT SYSTOLIC BLOOD PRESSURE < 130 MM HG: ICD-10-PCS | Mod: CPTII,S$GLB,, | Performed by: NURSE PRACTITIONER

## 2023-04-17 PROCEDURE — 1160F PR REVIEW ALL MEDS BY PRESCRIBER/CLIN PHARMACIST DOCUMENTED: ICD-10-PCS | Mod: CPTII,S$GLB,, | Performed by: NURSE PRACTITIONER

## 2023-04-17 PROCEDURE — 3008F BODY MASS INDEX DOCD: CPT | Mod: CPTII,S$GLB,, | Performed by: NURSE PRACTITIONER

## 2023-04-17 PROCEDURE — 1159F PR MEDICATION LIST DOCUMENTED IN MEDICAL RECORD: ICD-10-PCS | Mod: CPTII,S$GLB,, | Performed by: NURSE PRACTITIONER

## 2023-04-17 PROCEDURE — 1160F RVW MEDS BY RX/DR IN RCRD: CPT | Mod: CPTII,S$GLB,, | Performed by: NURSE PRACTITIONER

## 2023-04-17 NOTE — PROGRESS NOTES
[facility]    Chief Complaint      Chief Complaint   Patient presents with    Pre-op Exam     History of Present Illness     Geraldine Longoria is a 72 y.o. female patient of Dr. ramos's with chronic conditions of HTN, HLD, hypothyroid, T2DM who is new to me and presents today for medical clearance for cataract repair with Dr. Ndiaye.  Patient feeling well no complaints, denies shortness of breath or chest pain reviewed meds and history of patient    A1c 6.9%- pt is diet controlled, vegetarian  Walks for exercise  Stays hydrated             Health Maintenance   Topic Date Due    Hepatitis C Screening  Never done    Foot Exam  Never done    Low Dose Statin  Never done    TETANUS VACCINE  10/19/2017    Eye Exam  03/15/2023    Mammogram  05/18/2023    Hemoglobin A1c  05/29/2023    Lipid Panel  11/29/2023    DEXA Scan  05/18/2025       Past Medical History:   Diagnosis Date    Thyroid disease        Past Surgical History:   Procedure Laterality Date    BREAST SURGERY  march 1994    KIDNEY SURGERY         family history includes Arthritis in her mother; Cancer in her paternal aunt; Diabetes in her sister; Hypertension in her sister.    Social History     Tobacco Use    Smoking status: Never    Smokeless tobacco: Never   Substance Use Topics    Alcohol use: Never    Drug use: Never       Review of Systems   Constitutional:  Negative for chills and fever.   HENT:  Negative for congestion, sinus pain and sore throat.    Eyes:  Negative for blurred vision.   Respiratory:  Negative for cough, shortness of breath and wheezing.    Cardiovascular:  Negative for chest pain, palpitations and leg swelling.   Gastrointestinal:  Negative for abdominal pain, constipation, diarrhea, nausea and vomiting.   Genitourinary:  Negative for dysuria.   Musculoskeletal:  Negative for myalgias.   Skin:  Negative for rash.   Neurological:  Negative for dizziness, weakness and headaches.   Psychiatric/Behavioral:  Negative for  depression. The patient is not nervous/anxious.       Outpatient Encounter Medications as of 4/17/2023   Medication Sig Dispense Refill    chlorthalidone (HYGROTEN) 25 MG Tab TAKE 1 TABLET EVERY DAY 90 tablet 3    levothyroxine (SYNTHROID) 88 MCG tablet TAKE 1 TABLET EVERY DAY 90 tablet 3    linaCLOtide (LINZESS) 145 mcg Cap capsule Take 1 capsule (145 mcg total) by mouth before breakfast. 90 capsule 3    traMADoL (ULTRAM) 50 mg tablet Take 1 tablet (50 mg total) by mouth every 12 (twelve) hours as needed for Pain. (Patient not taking: Reported on 4/17/2023) 30 tablet 0     No facility-administered encounter medications on file as of 4/17/2023.        Review of patient's allergies indicates:  No Known Allergies    Physical Exam      Vital Signs  Temp: 98 °F (36.7 °C)  Pulse: 87  Resp: 16  SpO2: 98 %  BP: 122/60  BP Location: Right arm  Patient Position: Sitting  Height and Weight  Weight: 74.4 kg (164 lb 0.4 oz)    Physical Exam  Vitals and nursing note reviewed.   Constitutional:       General: She is not in acute distress.     Appearance: Normal appearance. She is well-developed. She is not ill-appearing.   HENT:      Head: Normocephalic and atraumatic.      Right Ear: External ear normal.      Left Ear: External ear normal.   Eyes:      Conjunctiva/sclera: Conjunctivae normal.      Pupils: Pupils are equal, round, and reactive to light.   Neck:      Thyroid: No thyromegaly.      Vascular: No JVD.      Trachea: No tracheal deviation.   Cardiovascular:      Rate and Rhythm: Normal rate and regular rhythm.      Heart sounds: Normal heart sounds. No murmur heard.  Pulmonary:      Effort: Pulmonary effort is normal.      Breath sounds: Normal breath sounds.   Chest:      Chest wall: No tenderness.   Abdominal:      General: Bowel sounds are normal.      Palpations: Abdomen is soft.      Tenderness: There is no abdominal tenderness. There is no guarding.   Musculoskeletal:         General: Normal range of motion.       Cervical back: Normal range of motion and neck supple.   Lymphadenopathy:      Cervical: No cervical adenopathy.   Skin:     General: Skin is warm and dry.   Neurological:      General: No focal deficit present.      Mental Status: She is alert and oriented to person, place, and time.   Psychiatric:         Mood and Affect: Mood normal.         Behavior: Behavior normal.         Thought Content: Thought content normal.         Judgment: Judgment normal.        Laboratory:  CBC:  Lab Results   Component Value Date    WBC 5.8 11/29/2022    RBC 4.56 11/29/2022    HGB 12.8 11/29/2022    HCT 38.9 11/29/2022     11/29/2022    MCV 85.3 11/29/2022    MCH 28.1 11/29/2022    MCHC 32.9 11/29/2022    MCHC 32.7 08/04/2021    MCHC 34.1 07/24/2020     CMP:  Lab Results   Component Value Date     (H) 11/29/2022    CALCIUM 10.1 11/29/2022    ALBUMIN 4.1 11/29/2022    PROT 6.9 11/29/2022     11/29/2022    K 3.5 11/29/2022    CO2 32 11/29/2022    CL 96 (L) 11/29/2022    BUN 8 11/29/2022    ALKPHOS 105 05/11/2022    ALT 6 11/29/2022    AST 20 11/29/2022    BILITOT 0.6 11/29/2022    BILITOT 0.6 05/11/2022    BILITOT 0.6 08/04/2021     URINALYSIS:  Lab Results   Component Value Date    COLORU Yellow 01/09/2016    SPECGRAV 1.010 01/09/2016    PHUR 7.0 01/09/2016    PROTEINUA Negative 01/09/2016    NITRITE Negative 01/09/2016    LEUKOCYTESUR 1+ (A) 01/09/2016    UROBILINOGEN Negative 01/09/2016      LIPIDS:  Lab Results   Component Value Date    TSH 0.43 11/29/2022    TSH 2.550 08/04/2021    TSH 1.12 07/24/2020    HDL 78 11/29/2022    HDL 72 05/11/2022    HDL 72 08/04/2021    CHOL 257 (H) 11/29/2022    CHOL 241 (H) 05/11/2022    CHOL 256 (H) 08/04/2021    TRIG 89 11/29/2022    TRIG 121 05/11/2022    TRIG 109 08/04/2021    LDLCALC 160 (H) 11/29/2022    LDLCALC 150 (H) 05/11/2022    LDLCALC 162.2 (H) 08/04/2021    CHOLHDL 3.3 11/29/2022    CHOLHDL 28.1 08/04/2021    NONHDLCHOL 179 (H) 11/29/2022    NONHDLCHOL 184  08/04/2021    NONHDLCHOL 181 (H) 07/24/2020    TOTALCHOLEST 3.6 08/04/2021     TSH:  Lab Results   Component Value Date    TSH 0.43 11/29/2022    TSH 2.550 08/04/2021    TSH 1.12 07/24/2020     A1C:  Lab Results   Component Value Date    HGBA1C 6.9 (H) 11/29/2022    HGBA1C 6.6 (H) 05/11/2022    HGBA1C 6.5 (H) 08/04/2021    HGBA1C 6.5 (H) 02/10/2021    HGBA1C 6.4 (H) 07/24/2020         Assessment/Plan     Geraldine Longoria is a 72 y.o.female with:    Pre-op examination    Cataract, unspecified cataract type, unspecified laterality    Type 2 diabetes mellitus without complication, without long-term current use of insulin    Essential hypertension          Health Maintenance Due   Topic Date Due    Hepatitis C Screening  Never done    Pneumococcal Vaccines (Age 65+) (1 - PCV) Never done    Foot Exam  Never done    Low Dose Statin  Never done    Shingles Vaccine (1 of 2) Never done    TETANUS VACCINE  10/19/2017    COVID-19 Vaccine (4 - Booster for Pfizer series) 02/01/2022    Influenza Vaccine (1) Never done    Eye Exam  03/15/2023    Mammogram  05/18/2023      Pt is medically optimized for low risk procedure with low cardiac risk profile and may proceed with surgery wit Dr. Ndiaye      I spent 39 minutes on the day of this encounter for preparing for, evaluating, treating, and managing this patient.      -Continue current medications and maintain follow up with specialists.  Return to clinic as needed for any concerns   No follow-ups on file.       ALLISON Ty  [facility]

## 2023-05-10 ENCOUNTER — OFFICE VISIT (OUTPATIENT)
Dept: PRIMARY CARE CLINIC | Facility: CLINIC | Age: 73
End: 2023-05-10
Payer: MEDICARE

## 2023-05-10 VITALS
OXYGEN SATURATION: 97 % | HEART RATE: 78 BPM | HEIGHT: 65 IN | WEIGHT: 160.69 LBS | SYSTOLIC BLOOD PRESSURE: 130 MMHG | DIASTOLIC BLOOD PRESSURE: 78 MMHG | BODY MASS INDEX: 26.77 KG/M2

## 2023-05-10 DIAGNOSIS — K59.00 CONSTIPATION, UNSPECIFIED CONSTIPATION TYPE: ICD-10-CM

## 2023-05-10 DIAGNOSIS — I47.19 AVNRT (AV NODAL RE-ENTRY TACHYCARDIA): ICD-10-CM

## 2023-05-10 DIAGNOSIS — E03.9 HYPOTHYROIDISM (ACQUIRED): ICD-10-CM

## 2023-05-10 DIAGNOSIS — I10 ESSENTIAL HYPERTENSION: ICD-10-CM

## 2023-05-10 DIAGNOSIS — M35.01 SJOGREN SYNDROME WITH KERATOCONJUNCTIVITIS: ICD-10-CM

## 2023-05-10 DIAGNOSIS — I47.10 SVT (SUPRAVENTRICULAR TACHYCARDIA): ICD-10-CM

## 2023-05-10 DIAGNOSIS — E78.2 HYPERLIPIDEMIA, MIXED: ICD-10-CM

## 2023-05-10 DIAGNOSIS — E11.9 TYPE 2 DIABETES MELLITUS WITHOUT COMPLICATION, WITHOUT LONG-TERM CURRENT USE OF INSULIN: Primary | ICD-10-CM

## 2023-05-10 DIAGNOSIS — Z12.31 SCREENING MAMMOGRAM, ENCOUNTER FOR: ICD-10-CM

## 2023-05-10 PROCEDURE — 3078F DIAST BP <80 MM HG: CPT | Mod: CPTII,S$GLB,, | Performed by: INTERNAL MEDICINE

## 2023-05-10 PROCEDURE — 3075F SYST BP GE 130 - 139MM HG: CPT | Mod: CPTII,S$GLB,, | Performed by: INTERNAL MEDICINE

## 2023-05-10 PROCEDURE — 99999 PR PBB SHADOW E&M-EST. PATIENT-LVL IV: CPT | Mod: PBBFAC,,, | Performed by: INTERNAL MEDICINE

## 2023-05-10 PROCEDURE — 1159F PR MEDICATION LIST DOCUMENTED IN MEDICAL RECORD: ICD-10-PCS | Mod: CPTII,S$GLB,, | Performed by: INTERNAL MEDICINE

## 2023-05-10 PROCEDURE — 1126F PR PAIN SEVERITY QUANTIFIED, NO PAIN PRESENT: ICD-10-PCS | Mod: CPTII,S$GLB,, | Performed by: INTERNAL MEDICINE

## 2023-05-10 PROCEDURE — 3075F PR MOST RECENT SYSTOLIC BLOOD PRESS GE 130-139MM HG: ICD-10-PCS | Mod: CPTII,S$GLB,, | Performed by: INTERNAL MEDICINE

## 2023-05-10 PROCEDURE — 99214 PR OFFICE/OUTPT VISIT, EST, LEVL IV, 30-39 MIN: ICD-10-PCS | Mod: S$GLB,,, | Performed by: INTERNAL MEDICINE

## 2023-05-10 PROCEDURE — 3008F BODY MASS INDEX DOCD: CPT | Mod: CPTII,S$GLB,, | Performed by: INTERNAL MEDICINE

## 2023-05-10 PROCEDURE — 99999 PR PBB SHADOW E&M-EST. PATIENT-LVL IV: ICD-10-PCS | Mod: PBBFAC,,, | Performed by: INTERNAL MEDICINE

## 2023-05-10 PROCEDURE — 1126F AMNT PAIN NOTED NONE PRSNT: CPT | Mod: CPTII,S$GLB,, | Performed by: INTERNAL MEDICINE

## 2023-05-10 PROCEDURE — 3078F PR MOST RECENT DIASTOLIC BLOOD PRESSURE < 80 MM HG: ICD-10-PCS | Mod: CPTII,S$GLB,, | Performed by: INTERNAL MEDICINE

## 2023-05-10 PROCEDURE — 3008F PR BODY MASS INDEX (BMI) DOCUMENTED: ICD-10-PCS | Mod: CPTII,S$GLB,, | Performed by: INTERNAL MEDICINE

## 2023-05-10 PROCEDURE — 1159F MED LIST DOCD IN RCRD: CPT | Mod: CPTII,S$GLB,, | Performed by: INTERNAL MEDICINE

## 2023-05-10 PROCEDURE — 99214 OFFICE O/P EST MOD 30 MIN: CPT | Mod: S$GLB,,, | Performed by: INTERNAL MEDICINE

## 2023-05-10 RX ORDER — LEVOTHYROXINE SODIUM 88 UG/1
88 TABLET ORAL DAILY
Qty: 90 TABLET | Refills: 3 | Status: SHIPPED | OUTPATIENT
Start: 2023-05-10

## 2023-05-10 RX ORDER — CHLORTHALIDONE 25 MG/1
25 TABLET ORAL DAILY
Qty: 90 TABLET | Refills: 3 | Status: SHIPPED | OUTPATIENT
Start: 2023-05-10

## 2023-05-10 NOTE — PROGRESS NOTES
[facility] Note    Chief Complaint      Chief Complaint   Patient presents with    Follow-up     6 month f/u       History of Present Illness      Geraldine Longoria is a 72 y.o. female with chronic conditions of DM2, HTN, HLD, hypothyroidism, AVNRT who presents today for: follow up chronic conditions.  Saw Dr. Chávez for immunology testing.    DM2: A1C 6.5 last check.  Not on meds.  Eye exam UTD 3/2023 with Dr. Ndiaye.  HTN: BP at goal on chlorthalidone    HLD:  last check.  Hypothyroidism: Controlled on synthroid.  TSH at goal last check.  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  Has seen Cardiology previously.  Flu shot declines.  Td 2007, declines.  Pneumovax, prevnar declines.  Shingrix declines. COVID vaccine UTD  Mammogram 5/2022.  FIT 6/2021 negative.  Cscope 2022, Dr. Patton, no polyps, 10 yr interval.     Past Medical History:  Past Medical History:   Diagnosis Date    Thyroid disease        Past Surgical History:   has a past surgical history that includes Kidney surgery; Breast surgery (03/1994); and Cataract extraction (Right, 04/2023).    Family History:  family history includes Arthritis in her mother; Cancer in her paternal aunt; Diabetes in her sister; Hypertension in her sister.     Social History:  Social History     Tobacco Use    Smoking status: Never    Smokeless tobacco: Never   Substance Use Topics    Alcohol use: Never    Drug use: Never       I personally reviewed all past medical, surgical, social and family history.    Review of Systems   Constitutional:  Negative for chills, fever and malaise/fatigue.   Respiratory:  Negative for shortness of breath.    Cardiovascular:  Negative for chest pain.   Gastrointestinal:  Negative for constipation, diarrhea, nausea and vomiting.   Skin:  Negative for rash.   Neurological:  Negative for weakness.   All other systems reviewed and are negative.     Medications:  Outpatient Encounter Medications as of 5/10/2023   Medication  "Sig Dispense Refill    chlorthalidone (HYGROTEN) 25 MG Tab TAKE 1 TABLET EVERY DAY 90 tablet 3    levothyroxine (SYNTHROID) 88 MCG tablet TAKE 1 TABLET EVERY DAY 90 tablet 3    linaCLOtide (LINZESS) 145 mcg Cap capsule Take 1 capsule (145 mcg total) by mouth before breakfast. 90 capsule 3    traMADoL (ULTRAM) 50 mg tablet Take 1 tablet (50 mg total) by mouth every 12 (twelve) hours as needed for Pain. (Patient not taking: Reported on 4/17/2023) 30 tablet 0     No facility-administered encounter medications on file as of 5/10/2023.       Allergies:  Review of patient's allergies indicates:  No Known Allergies    Health Maintenance:  Immunization History   Administered Date(s) Administered    COVID-19, MRNA, LN-S, PF (Pfizer) (Purple Cap) 04/16/2021, 05/07/2021, 12/07/2021    Td (ADULT) 10/19/2007      Health Maintenance   Topic Date Due    Hepatitis C Screening  Never done    Foot Exam  Never done    Low Dose Statin  Never done    TETANUS VACCINE  10/19/2017    Eye Exam  03/15/2023    Mammogram  05/18/2023    Hemoglobin A1c  05/29/2023    Lipid Panel  11/29/2023    DEXA Scan  05/18/2025        Physical Exam      Vital Signs  Pulse: 78  SpO2: 97 %  BP: 130/78  BP Location: Left arm  Patient Position: Sitting  Pain Score: 0-No pain  Height and Weight  Height: 5' 5" (165.1 cm)  Weight: 72.9 kg (160 lb 11.5 oz)  BSA (Calculated - sq m): 1.83 sq meters  BMI (Calculated): 26.7  Weight in (lb) to have BMI = 25: 149.9]    Physical Exam  Vitals reviewed.   Constitutional:       Appearance: She is well-developed.   HENT:      Head: Normocephalic and atraumatic.      Right Ear: External ear normal.      Left Ear: External ear normal.   Cardiovascular:      Rate and Rhythm: Normal rate and regular rhythm.      Heart sounds: Normal heart sounds. No murmur heard.  Pulmonary:      Effort: Pulmonary effort is normal.      Breath sounds: Normal breath sounds. No wheezing or rales.   Abdominal:      General: Bowel sounds are normal. " There is no distension.      Palpations: Abdomen is soft.      Tenderness: There is no abdominal tenderness.        Laboratory:  CBC:  Recent Labs   Lab 07/24/20  0818 08/04/21 0715 11/29/22  0645   WBC 5.4 6.53 5.8   RBC 4.45 4.69 4.56   Hemoglobin 12.6 13.3 12.8   Hematocrit 37.0 40.7 38.9   Platelets 224 319 271   MCV 83.1 87 85.3   MCH 28.4 28.4 28.1   MCHC 34.1 32.7 32.9     CMP:  Recent Labs   Lab 08/04/21  0715 05/11/22  0749 11/29/22  0645   Glucose 119 H 128 H 111 H   Calcium 10.5 10.7 H 10.1   Albumin 4.1  --  4.1   ALBUMIN  --  4.3  --    Total Protein 7.3 6.8 6.9   Sodium 137 140 135   Potassium 3.0 L 3.4 L 3.5   CO2 32 H 30 32   Chloride 97 100 96 L   BUN 11 12.0 8   Alkaline Phosphatase 90 105  --    ALT 7 L 7 6   AST 30 18 20   Total Bilirubin 0.6 0.6 0.6     URINALYSIS:       LIPIDS:  Recent Labs   Lab 07/24/20  0818 08/04/21  0715 05/11/22  0749 11/29/22  0645   TSH 1.12 2.550  --  0.43   HDL 71 72 72 78   Cholesterol 252 H 256 H 241 H 257 H   Triglycerides 95 109 121 89   LDL Calculated 166 H  --  150 H  --    LDL Cholesterol  --  162.2 H  --  160 H   HDL/Cholesterol Ratio  --  28.1  --  3.3   Non-HDL Cholesterol 181 H 184  --   --    Non HDL Chol. (LDL+VLDL)  --   --   --  179 H   Total Cholesterol/HDL Ratio  --  3.6  --   --      TSH:  Recent Labs   Lab 07/24/20 0818 08/04/21 0715 11/29/22  0645   TSH 1.12 2.550 0.43     A1C:  Recent Labs   Lab 07/24/20  0818 02/10/21  1535 08/04/21 0715 05/11/22  0749 11/29/22  0645   Hemoglobin A1C 6.4 H 6.5 H 6.5 H 6.6 H 6.9 H       Assessment/Plan     Geraldine Longoria is a 72 y.o.female with:    1. Type 2 diabetes mellitus without complication, without long-term current use of insulin  - Basic Metabolic Panel; Future  - Hemoglobin A1C; Future  - Basic Metabolic Panel  - Hemoglobin A1C  Continue diet.  Update labs.  Eye exam UTD.    2. Essential hypertension  Continue current meds.    3. Hyperlipidemia, mixed  - Lipid Panel; Future  - Lipid Panel  Counseled on  diet.    4. Hypothyroidism (acquired)  Continue current meds.    5. AVNRT (AV aftab re-entry tachycardia)  6. SVT (supraventricular tachycardia)  Stable  7. Screening mammogram, encounter for  - Mammo Digital Screening Bilat w/ Tirso; Future  - Mammo Digital Screening Bilat w/ Tirso       Chronic conditions status updated as per HPI.  Other than changes above, cont current medications and maintain follow up with specialists.  Follow up in about 6 months (around 11/10/2023) for Annual preventative visit.    No future appointments.    Brett Garrison MD  [facility]

## 2023-05-10 NOTE — PROGRESS NOTES
Subjective     Patient ID: Geraldine Longoria is a 72 y.o. female.    Chief Complaint: Follow-up (6 month f/u)    HPI  Geraldine Longoria is a 72 y.o. female with chronic conditions of DM2, HTN, HLD, hypothyroidism, AVNRT who presents today for: follow up chronic conditions. Cataract surgery 4/16/23, follow up on 5/19. Reports photophobia, otherwise feeling well. Diet:      DM2: A1C 6.9 last check.  Not on meds.  Eye exam UTD 3/2022 with DR. Ndiaye.  HTN: BP at goal on chlorthalidone    HLD:  last check.  Hypothyroidism: Controlled on synthroid.  TSH at goal last check.  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  Has seen Cardiology previously, ablation performed.    Flu shot declines.  Td 2007, declines.  Pneumovax, prevnar declines.  Shingrix declines. COVID vaccine UTD  Mammogram due. Dexa scan declines.   FIT 6/2021 negative.  Cscope 2022, Dr. Patton, no polyps, 10 yr interval.     Review of Systems   Constitutional:  Negative for activity change, chills, diaphoresis and fatigue.   HENT:  Negative for ear pain, hearing loss and sore throat.    Eyes:  Negative for visual disturbance.   Respiratory:  Negative for cough and shortness of breath.    Cardiovascular:  Negative for chest pain, palpitations and leg swelling.   Gastrointestinal:  Negative for abdominal pain, blood in stool, change in bowel habit, nausea, vomiting and change in bowel habit.   Genitourinary:  Negative for difficulty urinating.   Musculoskeletal:  Negative for arthralgias and back pain.   Neurological:  Negative for dizziness, weakness, light-headedness, numbness and headaches.        Objective     Physical Exam  Constitutional:       General: She is not in acute distress.     Appearance: Normal appearance. She is normal weight. She is not ill-appearing.   HENT:      Head: Normocephalic and atraumatic.      Nose: Nose normal.      Mouth/Throat:      Mouth: Mucous membranes are moist.      Pharynx: Oropharynx is clear.   Eyes:      Extraocular  Movements: Extraocular movements intact.      Pupils: Pupils are equal, round, and reactive to light.   Cardiovascular:      Rate and Rhythm: Normal rate and regular rhythm.      Pulses: Normal pulses.      Heart sounds: Normal heart sounds.   Pulmonary:      Effort: Pulmonary effort is normal. No respiratory distress.      Breath sounds: Normal breath sounds.   Abdominal:      General: Abdomen is flat. Bowel sounds are normal.      Palpations: Abdomen is soft.   Musculoskeletal:         General: No swelling. Normal range of motion.      Cervical back: Normal range of motion.      Right lower leg: No edema.      Left lower leg: No edema.   Skin:     General: Skin is warm.   Neurological:      General: No focal deficit present.      Mental Status: She is alert.   Psychiatric:         Mood and Affect: Mood normal.         Behavior: Behavior normal.          Assessment and Plan     Problem List Items Addressed This Visit          Cardiac/Vascular    SVT (supraventricular tachycardia)    AVNRT (AV aftab re-entry tachycardia)    Essential hypertension    Relevant Medications    chlorthalidone (HYGROTEN) 25 MG Tab    Hyperlipidemia, mixed    Relevant Orders    Lipid Panel       Endocrine    Type 2 diabetes mellitus without complication, without long-term current use of insulin - Primary    Relevant Orders    Basic Metabolic Panel    Hemoglobin A1C    Hypothyroidism (acquired)    Relevant Medications    levothyroxine (SYNTHROID) 88 MCG tablet       Other    Sjogren syndrome with keratoconjunctivitis     Other Visit Diagnoses       Screening mammogram, encounter for        Relevant Orders    Mammo Digital Screening Bilat w/ Tirso    Constipation, unspecified constipation type        Relevant Medications    linaCLOtide (LINZESS) 145 mcg Cap capsule            Annual physical exam  Discussed diet and exercise, vaccines and cancer screening, risk factors.  Screening labs ordered.    - ordered mammogram     Type 2 diabetes  mellitus without complication, without long-term current use of insulin  Continue current meds.  Update labs.  Eye exam UTD.  - ordered A1c and BMP    Essential hypertension  Continue current meds.      Hyperlipidemia, mixed  Counseled on diet and exercise.  Consider CT calcium score to further risk stratify  - ordered lipid panel    Hypothyroidism (acquired)  Continue current meds.      SVT (supraventricular tachycardia)  Stable.    Constipation  Well controlled with linzess.       Emily Gault [facility], MS4

## 2023-05-16 LAB
BUN SERPL-MCNC: 8 MG/DL (ref 7–25)
BUN/CREAT SERPL: ABNORMAL (CALC) (ref 6–22)
CALCIUM SERPL-MCNC: 10.2 MG/DL (ref 8.6–10.4)
CHLORIDE SERPL-SCNC: 93 MMOL/L (ref 98–110)
CHOLEST SERPL-MCNC: 251 MG/DL
CHOLEST/HDLC SERPL: 2.9 (CALC)
CO2 SERPL-SCNC: 30 MMOL/L (ref 20–32)
CREAT SERPL-MCNC: 0.83 MG/DL (ref 0.6–1)
EGFR: 75 ML/MIN/1.73M2
GLUCOSE SERPL-MCNC: 110 MG/DL (ref 65–99)
HBA1C MFR BLD: 6.5 % OF TOTAL HGB
HDLC SERPL-MCNC: 87 MG/DL
LDLC SERPL CALC-MCNC: 146 MG/DL (CALC)
NONHDLC SERPL-MCNC: 164 MG/DL (CALC)
POTASSIUM SERPL-SCNC: 3.2 MMOL/L (ref 3.5–5.3)
SODIUM SERPL-SCNC: 132 MMOL/L (ref 135–146)
TRIGL SERPL-MCNC: 74 MG/DL

## 2023-05-22 ENCOUNTER — PES CALL (OUTPATIENT)
Dept: ADMINISTRATIVE | Facility: CLINIC | Age: 73
End: 2023-05-22
Payer: MEDICARE

## 2023-06-01 ENCOUNTER — PATIENT OUTREACH (OUTPATIENT)
Dept: ADMINISTRATIVE | Facility: HOSPITAL | Age: 73
End: 2023-06-01
Payer: MEDICARE

## 2023-06-01 NOTE — LETTER
AUTHORIZATION FOR RELEASE OF   CONFIDENTIAL INFORMATION    Dear Dr. Ndiaye [facility],    We are seeing Geraldine Longoria, date of birth 1950, in the clinic at [facility]. Brett Garrison MD is the patient's PCP. Geraldine Longoria has an outstanding lab/procedure at the time we reviewed her chart. In order to help keep her health information updated, she has authorized us to request the following medical record(s):     (XX)  DIABETIC EYE EXAM           **Please indicate whether there is Diabetic Retinopathy or not**           Please fax records to Brett Garrison MD, [phone].     If you have any questions, please contact JESSENIA James at [phone].           Patient Name: Geraldine Longoria  : 1950  Patient Phone #: [phone]

## 2023-06-01 NOTE — PROGRESS NOTES
Health Maintenance Due   Topic Date Due    Hepatitis C Screening  Never done    Pneumococcal Vaccines (Age 65+) (1 - PCV) Never done    Foot Exam  Never done    Low Dose Statin  Never done    Shingles Vaccine (1 of 2) Never done    TETANUS VACCINE  10/19/2017    COVID-19 Vaccine (4 - Pfizer series) 02/01/2022    Eye Exam  03/15/2023    Mammogram  05/18/2023     Chart review done. HM updated. Immunizations reviewed & updated. Care Everywhere updated.  PATRICIA to [facility] - Dr. Ndiaye for DM Eye Exam.

## 2023-06-02 ENCOUNTER — PATIENT OUTREACH (OUTPATIENT)
Dept: ADMINISTRATIVE | Facility: HOSPITAL | Age: 73
End: 2023-06-02
Payer: MEDICARE

## 2023-06-02 NOTE — PROGRESS NOTES
Health Maintenance Due   Topic Date Due    Hepatitis C Screening  Never done    Pneumococcal Vaccines (Age 65+) (1 - PCV) Never done    Foot Exam  Never done    Low Dose Statin  Never done    Shingles Vaccine (1 of 2) Never done    TETANUS VACCINE  10/19/2017    COVID-19 Vaccine (4 - Pfizer series) 02/01/2022    Eye Exam  03/15/2023    Mammogram  05/18/2023     Chart review done. HM updated. Immunizations reviewed & updated. Care Everywhere updated.  Eye Exam from 7/7/22 uploaded to .

## 2023-08-08 ENCOUNTER — HOSPITAL ENCOUNTER (OUTPATIENT)
Dept: RADIOLOGY | Facility: HOSPITAL | Age: 73
Discharge: HOME | End: 2023-08-08
Attending: INTERNAL MEDICINE
Payer: MEDICARE

## 2023-08-08 ENCOUNTER — OFFICE VISIT (OUTPATIENT)
Dept: PRIMARY CARE CLINIC | Facility: CLINIC | Age: 73
End: 2023-08-08
Payer: MEDICARE

## 2023-08-08 VITALS
SYSTOLIC BLOOD PRESSURE: 130 MMHG | WEIGHT: 159.63 LBS | BODY MASS INDEX: 26.6 KG/M2 | HEART RATE: 88 BPM | OXYGEN SATURATION: 98 % | HEIGHT: 65 IN | DIASTOLIC BLOOD PRESSURE: 60 MMHG

## 2023-08-08 DIAGNOSIS — R05.9 COUGH, UNSPECIFIED TYPE: Primary | ICD-10-CM

## 2023-08-08 DIAGNOSIS — R05.9 COUGH, UNSPECIFIED TYPE: ICD-10-CM

## 2023-08-08 PROCEDURE — 3044F PR MOST RECENT HEMOGLOBIN A1C LEVEL <7.0%: ICD-10-PCS | Mod: CPTII,S$GLB,, | Performed by: INTERNAL MEDICINE

## 2023-08-08 PROCEDURE — 99999 PR PBB SHADOW E&M-EST. PATIENT-LVL III: ICD-10-PCS | Mod: PBBFAC,,, | Performed by: INTERNAL MEDICINE

## 2023-08-08 PROCEDURE — 71046 X-RAY EXAM CHEST 2 VIEWS: CPT | Mod: TC

## 2023-08-08 PROCEDURE — 1159F PR MEDICATION LIST DOCUMENTED IN MEDICAL RECORD: ICD-10-PCS | Mod: CPTII,S$GLB,, | Performed by: INTERNAL MEDICINE

## 2023-08-08 PROCEDURE — 71046 XR CHEST PA AND LATERAL: ICD-10-PCS | Mod: 26,,, | Performed by: RADIOLOGY

## 2023-08-08 PROCEDURE — 3008F PR BODY MASS INDEX (BMI) DOCUMENTED: ICD-10-PCS | Mod: CPTII,S$GLB,, | Performed by: INTERNAL MEDICINE

## 2023-08-08 PROCEDURE — 3078F DIAST BP <80 MM HG: CPT | Mod: CPTII,S$GLB,, | Performed by: INTERNAL MEDICINE

## 2023-08-08 PROCEDURE — 1126F AMNT PAIN NOTED NONE PRSNT: CPT | Mod: CPTII,S$GLB,, | Performed by: INTERNAL MEDICINE

## 2023-08-08 PROCEDURE — 3078F PR MOST RECENT DIASTOLIC BLOOD PRESSURE < 80 MM HG: ICD-10-PCS | Mod: CPTII,S$GLB,, | Performed by: INTERNAL MEDICINE

## 2023-08-08 PROCEDURE — 3044F HG A1C LEVEL LT 7.0%: CPT | Mod: CPTII,S$GLB,, | Performed by: INTERNAL MEDICINE

## 2023-08-08 PROCEDURE — 99214 OFFICE O/P EST MOD 30 MIN: CPT | Mod: S$GLB,,, | Performed by: INTERNAL MEDICINE

## 2023-08-08 PROCEDURE — 99999 PR PBB SHADOW E&M-EST. PATIENT-LVL III: CPT | Mod: PBBFAC,,, | Performed by: INTERNAL MEDICINE

## 2023-08-08 PROCEDURE — 1126F PR PAIN SEVERITY QUANTIFIED, NO PAIN PRESENT: ICD-10-PCS | Mod: CPTII,S$GLB,, | Performed by: INTERNAL MEDICINE

## 2023-08-08 PROCEDURE — 3075F SYST BP GE 130 - 139MM HG: CPT | Mod: CPTII,S$GLB,, | Performed by: INTERNAL MEDICINE

## 2023-08-08 PROCEDURE — 1159F MED LIST DOCD IN RCRD: CPT | Mod: CPTII,S$GLB,, | Performed by: INTERNAL MEDICINE

## 2023-08-08 PROCEDURE — 3008F BODY MASS INDEX DOCD: CPT | Mod: CPTII,S$GLB,, | Performed by: INTERNAL MEDICINE

## 2023-08-08 PROCEDURE — 71046 X-RAY EXAM CHEST 2 VIEWS: CPT | Mod: 26,,, | Performed by: RADIOLOGY

## 2023-08-08 PROCEDURE — 99214 PR OFFICE/OUTPT VISIT, EST, LEVL IV, 30-39 MIN: ICD-10-PCS | Mod: S$GLB,,, | Performed by: INTERNAL MEDICINE

## 2023-08-08 PROCEDURE — 3075F PR MOST RECENT SYSTOLIC BLOOD PRESS GE 130-139MM HG: ICD-10-PCS | Mod: CPTII,S$GLB,, | Performed by: INTERNAL MEDICINE

## 2023-08-08 RX ORDER — AMOXICILLIN AND CLAVULANATE POTASSIUM 875; 125 MG/1; MG/1
1 TABLET, FILM COATED ORAL 2 TIMES DAILY
Qty: 20 TABLET | Refills: 0 | Status: SHIPPED | OUTPATIENT
Start: 2023-08-08

## 2023-08-08 RX ORDER — METHYLPREDNISOLONE 4 MG/1
TABLET ORAL
Qty: 21 EACH | Refills: 0 | Status: SHIPPED | OUTPATIENT
Start: 2023-08-08 | End: 2023-08-29

## 2023-08-08 NOTE — PROGRESS NOTES
[facility] Note    Chief Complaint      Chief Complaint   Patient presents with    Cough     Going on 2 months        History of Present Illness      Geraldine Longoria is a 73 y.o. female with chronic conditions of DM2, HTN, HLD, hypothyroidism, AVNRT who presents today for: cough present for the past 2 months.  Started in [place] with 3 days of fever, chills, cough, fatigue, given antibiotics without resolution.   Flu shot declines.  Td 2007, declines.  Pneumovax, prevnar declines.  Shingrix declines. COVID vaccine UTD  Mammogram due. Dexa scan declines.   FIT 6/2021 negative.  Cscope 2022, Dr. Patton, no polyps, 10 yr interval.     Past Medical History:  Past Medical History:   Diagnosis Date    Thyroid disease        Past Surgical History:   has a past surgical history that includes Kidney surgery; Breast surgery (03/1994); and Cataract extraction (Right, 04/2023).    Family History:  family history includes Arthritis in her mother; Cancer in her paternal aunt; Diabetes in her sister; Hypertension in her sister.     Social History:  Social History     Tobacco Use    Smoking status: Never    Smokeless tobacco: Never   Substance Use Topics    Alcohol use: Never    Drug use: Never       I personally reviewed all past medical, surgical, social and family history.    Review of Systems   Constitutional:  Negative for chills, fever and malaise/fatigue.   Respiratory:  Positive for cough, sputum production and wheezing. Negative for hemoptysis and shortness of breath.    Cardiovascular:  Negative for chest pain.   Gastrointestinal:  Negative for constipation, diarrhea, nausea and vomiting.   Skin:  Negative for rash.   Neurological:  Negative for weakness.   All other systems reviewed and are negative.       Medications:  Outpatient Encounter Medications as of 8/8/2023   Medication Sig Dispense Refill    amoxicillin-clavulanate 875-125mg (AUGMENTIN) 875-125 mg per tablet Take 1 tablet by mouth 2 (two) times  "daily. 20 tablet 0    chlorthalidone (HYGROTEN) 25 MG Tab Take 1 tablet (25 mg total) by mouth once daily. 90 tablet 3    levothyroxine (SYNTHROID) 88 MCG tablet Take 1 tablet (88 mcg total) by mouth once daily. 90 tablet 3    linaCLOtide (LINZESS) 145 mcg Cap capsule Take 1 capsule (145 mcg total) by mouth before breakfast. 90 capsule 3    methylPREDNISolone (MEDROL DOSEPACK) 4 mg tablet use as directed 21 each 0    traMADoL (ULTRAM) 50 mg tablet Take 1 tablet (50 mg total) by mouth every 12 (twelve) hours as needed for Pain. (Patient not taking: Reported on 4/17/2023) 30 tablet 0     No facility-administered encounter medications on file as of 8/8/2023.       Allergies:  Review of patient's allergies indicates:  No Known Allergies    Health Maintenance:  Immunization History   Administered Date(s) Administered    COVID-19, MRNA, LN-S, PF (Pfizer) (Purple Cap) 04/16/2021, 05/07/2021, 12/07/2021    Td (ADULT) 10/19/2007      Health Maintenance   Topic Date Due    Hepatitis C Screening  Never done    Foot Exam  Never done    Low Dose Statin  Never done    TETANUS VACCINE  10/19/2017    Eye Exam  03/15/2023    Hemoglobin A1c  11/15/2023    Lipid Panel  05/15/2024    Mammogram  05/26/2024    DEXA Scan  05/18/2025        Physical Exam      Vital Signs  Pulse: 88  SpO2: 98 %  BP: 130/60  BP Location: Right arm  Patient Position: Sitting  Pain Score: 0-No pain  Height and Weight  Height: 5' 5" (165.1 cm)  Weight: 72.4 kg (159 lb 9.8 oz)  BSA (Calculated - sq m): 1.82 sq meters  BMI (Calculated): 26.6  Weight in (lb) to have BMI = 25: 149.9]    Physical Exam  Vitals reviewed.   Constitutional:       Appearance: She is well-developed.   HENT:      Head: Normocephalic and atraumatic.      Right Ear: External ear normal.      Left Ear: External ear normal.   Cardiovascular:      Rate and Rhythm: Normal rate and regular rhythm.      Heart sounds: Normal heart sounds. No murmur heard.  Pulmonary:      Effort: Pulmonary effort " is normal.      Breath sounds: Normal breath sounds. No wheezing or rales.   Abdominal:      General: Bowel sounds are normal. There is no distension.      Palpations: Abdomen is soft.      Tenderness: There is no abdominal tenderness.          Laboratory:  CBC:  Recent Labs   Lab 08/04/21 0715 11/29/22 0645   WBC 6.53 5.8   RBC 4.69 4.56   Hemoglobin 13.3 12.8   Hematocrit 40.7 38.9   Platelets 319 271   MCV 87 85.3   MCH 28.4 28.1   MCHC 32.7 32.9     CMP:  Recent Labs   Lab 08/04/21 0715 05/11/22  0749 11/29/22 0645 05/15/23  0656   Glucose 119 H 128 H 111 H 110 H   Calcium 10.5 10.7 H 10.1 10.2   Albumin 4.1  --  4.1  --    ALBUMIN  --  4.3  --   --    Total Protein 7.3 6.8 6.9  --    Sodium 137 140 135 132 L   Potassium 3.0 L 3.4 L 3.5 3.2 L   CO2 32 H 30 32 30   Chloride 97 100 96 L 93 L   BUN 11 12.0 8 8   Alkaline Phosphatase 90 105  --   --    ALT 7 L 7 6  --    AST 30 18 20  --    Total Bilirubin 0.6 0.6 0.6  --      URINALYSIS:       LIPIDS:  Recent Labs   Lab 08/04/21 0715 05/11/22 0749 11/29/22 0645 05/15/23  0656   TSH 2.550  --  0.43  --    HDL 72 72 78 87   Cholesterol 256 H 241 H 257 H 251 H   Triglycerides 109 121 89 74   LDL Calculated  --  150 H  --   --    LDL Cholesterol 162.2 H  --  160 H 146 H   HDL/Cholesterol Ratio 28.1  --  3.3 2.9   Non-HDL Cholesterol 184  --   --   --    Non HDL Chol. (LDL+VLDL)  --   --  179 H 164 H   Total Cholesterol/HDL Ratio 3.6  --   --   --      TSH:  Recent Labs   Lab 08/04/21 0715 11/29/22  0645   TSH 2.550 0.43     A1C:  Recent Labs   Lab 02/10/21  1535 08/04/21 0715 05/11/22 0749 11/29/22 0645 05/15/23  0656   Hemoglobin A1C 6.5 H 6.5 H 6.6 H 6.9 H 6.5 H       Assessment/Plan     Geraldine Longoria is a 73 y.o.female with:    1. Cough, unspecified type  - X-Ray Chest PA And Lateral; Future  - methylPREDNISolone (MEDROL DOSEPACK) 4 mg tablet; use as directed  Dispense: 21 each; Refill: 0  - amoxicillin-clavulanate 875-125mg (AUGMENTIN) 875-125 mg per  tablet; Take 1 tablet by mouth 2 (two) times daily.  Dispense: 20 tablet; Refill: 0  Start medrol and augmentin.  Check CXR    Chronic conditions status updated as per HPI.  Other than changes above, cont current medications and maintain follow up with specialists.  No follow-ups on file.    Future Appointments   Date Time Provider Department Center   11/14/2023  1:30 PM Brett Garrison MD [facility]       Brett Garrison MD  [facility]

## 2023-08-09 DIAGNOSIS — J84.9 INTERSTITIAL PULMONARY DISEASE, UNSPECIFIED: Primary | ICD-10-CM

## 2023-08-09 NOTE — PROGRESS NOTES
CXR shows inflammation throughout the lungs.  Not consistent with a typical pneumonia but needs further evaluation with CT.  Ordered CT chest.

## 2023-08-11 ENCOUNTER — HOSPITAL ENCOUNTER (OUTPATIENT)
Dept: RADIOLOGY | Facility: HOSPITAL | Age: 73
Discharge: HOME | End: 2023-08-11
Attending: INTERNAL MEDICINE
Payer: MEDICARE

## 2023-08-11 DIAGNOSIS — J84.9 INTERSTITIAL PULMONARY DISEASE, UNSPECIFIED: ICD-10-CM

## 2023-08-11 PROCEDURE — 71250 CT CHEST WITHOUT CONTRAST: ICD-10-PCS | Mod: 26,HCNC,, | Performed by: INTERNAL MEDICINE

## 2023-08-11 PROCEDURE — 71250 CT THORAX DX C-: CPT | Mod: 26,HCNC,, | Performed by: INTERNAL MEDICINE

## 2023-08-11 PROCEDURE — 71250 CT THORAX DX C-: CPT | Mod: TC,HCNC

## 2023-08-21 ENCOUNTER — TELEPHONE (OUTPATIENT)
Dept: PRIMARY CARE CLINIC | Facility: CLINIC | Age: 73
End: 2023-08-21
Payer: MEDICARE

## 2023-08-21 DIAGNOSIS — E11.9 TYPE 2 DIABETES MELLITUS WITHOUT COMPLICATION, WITHOUT LONG-TERM CURRENT USE OF INSULIN: Primary | ICD-10-CM

## 2023-08-21 NOTE — TELEPHONE ENCOUNTER
Pt had A1C, BMP and Lipid done 5/15/2023. Do you want to add anything to these for pt to have done prior to upcoming appt?

## 2023-08-21 NOTE — TELEPHONE ENCOUNTER
"----- Message from Radha Arellano sent at 8/21/2023 11:49 AM CDT -----  Contact: [phone]  type: Lab    Caller is requesting to schedule their Lab appointment prior to annual appointment.  Order is not listed in EPIC.  Please enter order and contact patient to schedule.    Name of Caller:Geraldine    Alo Date and Time of Lab 11/07 6:30 or 7     Date of Annual Physical Appointment:11/14    Where would they like the lab performed?[facility]    Would the patient rather a call back or a response via [facility]? Call back     Best Call Back Number:[phone]    Additional Information:no    "Do not send messages requesting lab orders prior to Physical appt on these providers: Kamilah Hawkins, Melissa Henderson,  Silvestre Espinoza and Kelvin."       "

## 2023-10-27 ENCOUNTER — TELEPHONE (OUTPATIENT)
Dept: PRIMARY CARE CLINIC | Facility: CLINIC | Age: 73
End: 2023-10-27
Payer: MEDICARE

## 2023-10-27 DIAGNOSIS — N95.1 MENOPAUSAL SYNDROME (HOT FLASHES): Primary | ICD-10-CM

## 2023-10-27 NOTE — TELEPHONE ENCOUNTER
----- Message from Eloisa Darby sent at 10/27/2023 11:34 AM CDT -----  Contact: [phone] Patient  Patient would like to get medical advice.  Symptoms (please be specific):   pt states she has been hot and having hot flashes. Pt is requesting to have a hormone scan lab order added to her 11/07/23 appt at [facility].   How long have you had these symptoms: N/A  Would you like a call back, or a response through your [facility] portal?:   pt portal  Pharmacy name and phone # (copy from chart):   N/A  Comments:

## 2023-11-08 LAB — FSH SERPL-ACNC: 90.1 MIU/ML

## 2023-11-09 LAB
ESTRADIOL SERPL-MCNC: <15 PG/ML
LH SERPL-ACNC: 41.4 MIU/ML
PROGEST SERPL-MCNC: <0.5 NG/ML

## 2023-11-14 ENCOUNTER — OFFICE VISIT (OUTPATIENT)
Dept: PRIMARY CARE CLINIC | Facility: CLINIC | Age: 73
End: 2023-11-14
Payer: MEDICARE

## 2023-11-14 VITALS
SYSTOLIC BLOOD PRESSURE: 130 MMHG | DIASTOLIC BLOOD PRESSURE: 60 MMHG | HEIGHT: 65 IN | OXYGEN SATURATION: 97 % | BODY MASS INDEX: 26.48 KG/M2 | HEART RATE: 74 BPM | WEIGHT: 158.94 LBS

## 2023-11-14 DIAGNOSIS — I10 ESSENTIAL HYPERTENSION: ICD-10-CM

## 2023-11-14 DIAGNOSIS — E78.2 HYPERLIPIDEMIA, MIXED: ICD-10-CM

## 2023-11-14 DIAGNOSIS — M35.01 SJOGREN SYNDROME WITH KERATOCONJUNCTIVITIS: ICD-10-CM

## 2023-11-14 DIAGNOSIS — I47.19 AVNRT (AV NODAL RE-ENTRY TACHYCARDIA): ICD-10-CM

## 2023-11-14 DIAGNOSIS — E03.9 HYPOTHYROIDISM (ACQUIRED): ICD-10-CM

## 2023-11-14 DIAGNOSIS — E11.9 TYPE 2 DIABETES MELLITUS WITHOUT COMPLICATION, WITHOUT LONG-TERM CURRENT USE OF INSULIN: Primary | ICD-10-CM

## 2023-11-14 DIAGNOSIS — L98.9 SKIN LESION: ICD-10-CM

## 2023-11-14 DIAGNOSIS — J30.9 ALLERGIC RHINITIS, UNSPECIFIED SEASONALITY, UNSPECIFIED TRIGGER: ICD-10-CM

## 2023-11-14 PROCEDURE — 3008F BODY MASS INDEX DOCD: CPT | Mod: CPTII,S$GLB,, | Performed by: INTERNAL MEDICINE

## 2023-11-14 PROCEDURE — 3044F PR MOST RECENT HEMOGLOBIN A1C LEVEL <7.0%: ICD-10-PCS | Mod: CPTII,S$GLB,, | Performed by: INTERNAL MEDICINE

## 2023-11-14 PROCEDURE — 3075F SYST BP GE 130 - 139MM HG: CPT | Mod: CPTII,S$GLB,, | Performed by: INTERNAL MEDICINE

## 2023-11-14 PROCEDURE — 99999 PR PBB SHADOW E&M-EST. PATIENT-LVL IV: ICD-10-PCS | Mod: PBBFAC,,, | Performed by: INTERNAL MEDICINE

## 2023-11-14 PROCEDURE — 3044F HG A1C LEVEL LT 7.0%: CPT | Mod: CPTII,S$GLB,, | Performed by: INTERNAL MEDICINE

## 2023-11-14 PROCEDURE — 1126F AMNT PAIN NOTED NONE PRSNT: CPT | Mod: CPTII,S$GLB,, | Performed by: INTERNAL MEDICINE

## 2023-11-14 PROCEDURE — 99397 PER PM REEVAL EST PAT 65+ YR: CPT | Mod: S$GLB,,, | Performed by: INTERNAL MEDICINE

## 2023-11-14 PROCEDURE — 3078F DIAST BP <80 MM HG: CPT | Mod: CPTII,S$GLB,, | Performed by: INTERNAL MEDICINE

## 2023-11-14 PROCEDURE — 3078F PR MOST RECENT DIASTOLIC BLOOD PRESSURE < 80 MM HG: ICD-10-PCS | Mod: CPTII,S$GLB,, | Performed by: INTERNAL MEDICINE

## 2023-11-14 PROCEDURE — 99397 PR PREVENTIVE VISIT,EST,65 & OVER: ICD-10-PCS | Mod: S$GLB,,, | Performed by: INTERNAL MEDICINE

## 2023-11-14 PROCEDURE — 1159F PR MEDICATION LIST DOCUMENTED IN MEDICAL RECORD: ICD-10-PCS | Mod: CPTII,S$GLB,, | Performed by: INTERNAL MEDICINE

## 2023-11-14 PROCEDURE — 99999 PR PBB SHADOW E&M-EST. PATIENT-LVL IV: CPT | Mod: PBBFAC,,, | Performed by: INTERNAL MEDICINE

## 2023-11-14 PROCEDURE — 1159F MED LIST DOCD IN RCRD: CPT | Mod: CPTII,S$GLB,, | Performed by: INTERNAL MEDICINE

## 2023-11-14 PROCEDURE — 3008F PR BODY MASS INDEX (BMI) DOCUMENTED: ICD-10-PCS | Mod: CPTII,S$GLB,, | Performed by: INTERNAL MEDICINE

## 2023-11-14 PROCEDURE — 3075F PR MOST RECENT SYSTOLIC BLOOD PRESS GE 130-139MM HG: ICD-10-PCS | Mod: CPTII,S$GLB,, | Performed by: INTERNAL MEDICINE

## 2023-11-14 PROCEDURE — 1126F PR PAIN SEVERITY QUANTIFIED, NO PAIN PRESENT: ICD-10-PCS | Mod: CPTII,S$GLB,, | Performed by: INTERNAL MEDICINE

## 2023-11-14 RX ORDER — LEVOCETIRIZINE DIHYDROCHLORIDE 5 MG/1
5 TABLET, FILM COATED ORAL NIGHTLY
Qty: 30 TABLET | Refills: 11 | Status: SHIPPED | OUTPATIENT
Start: 2023-11-14 | End: 2024-11-13

## 2023-11-14 NOTE — PROGRESS NOTES
[facility] Note    Chief Complaint      Chief Complaint   Patient presents with    Annual Exam       History of Present Illness      Geraldine Longoria is a 73 y.o. female with chronic conditions of HTN, HLD, DM2, hypothyroidism, AVNRT who presents today for: annual preventative visit.  Complains of allergic rhinitis.    DM2: A1C 6.9 last check.  Not on meds.  Eye exam 3/2022 with DR. Ndiaye.  Has appt to follow up.  HTN: BP at goal on chlorthalidone    HLD:  last check.  Hypothyroidism: Controlled on synthroid.  TSH at goal last check.  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  Has seen Cardiology previously, ablation performed.  Diet: Prepares own food mostly.  Limiting fatty foods and carbs.  Drinks plenty water.  Exercise: Stays active.  Denies drinking and driving, drinking more than 4 drinks on occasion, drug use.     Flu shot declines.  Td 2007, declines.  Pneumovax, prevnar declines.  Shingrix declines. COVID vaccine UTD  Mammogram due. Dexa scan declines.   FIT 6/2021 negative.  Cscope 2022, Dr. Patton, no polyps, 10 yr interval.        Past Medical History:  Past Medical History:   Diagnosis Date    Thyroid disease        Past Surgical History:   has a past surgical history that includes Kidney surgery; Breast surgery (03/1994); and Cataract extraction (Right, 04/2023).    Family History:  family history includes Arthritis in her mother; Cancer in her paternal aunt; Diabetes in her sister; Hypertension in her sister.     Social History:  Social History     Tobacco Use    Smoking status: Never    Smokeless tobacco: Never   Substance Use Topics    Alcohol use: Never    Drug use: Never       I personally reviewed all past medical, surgical, social and family history.    Review of Systems   Constitutional:  Negative for chills, fever and malaise/fatigue.   Respiratory:  Negative for shortness of breath.    Cardiovascular:  Negative for chest pain.   Gastrointestinal:  Negative for  "constipation, diarrhea, nausea and vomiting.   Skin:  Negative for rash.   Neurological:  Negative for weakness.   All other systems reviewed and are negative.       Medications:  Outpatient Encounter Medications as of 11/14/2023   Medication Sig Dispense Refill    amoxicillin-clavulanate 875-125mg (AUGMENTIN) 875-125 mg per tablet Take 1 tablet by mouth 2 (two) times daily. (Patient not taking: Reported on 11/14/2023) 20 tablet 0    chlorthalidone (HYGROTEN) 25 MG Tab Take 1 tablet (25 mg total) by mouth once daily. 90 tablet 3    levocetirizine (XYZAL) 5 MG tablet Take 1 tablet (5 mg total) by mouth every evening. 30 tablet 11    levothyroxine (SYNTHROID) 88 MCG tablet Take 1 tablet (88 mcg total) by mouth once daily. 90 tablet 3    linaCLOtide (LINZESS) 145 mcg Cap capsule Take 1 capsule (145 mcg total) by mouth before breakfast. 90 capsule 3    traMADoL (ULTRAM) 50 mg tablet Take 1 tablet (50 mg total) by mouth every 12 (twelve) hours as needed for Pain. (Patient not taking: Reported on 4/17/2023) 30 tablet 0     No facility-administered encounter medications on file as of 11/14/2023.       Allergies:  Review of patient's allergies indicates:  No Known Allergies    Health Maintenance:  Immunization History   Administered Date(s) Administered    COVID-19, MRNA, LN-S, PF (Pfizer) (Purple Cap) 04/16/2021, 05/07/2021, 12/07/2021    Td (ADULT) 10/19/2007      Health Maintenance   Topic Date Due    Hepatitis C Screening  Never done    Foot Exam  Never done    Low Dose Statin  Never done    Shingles Vaccine (1 of 2) Never done    TETANUS VACCINE  10/19/2017    Eye Exam  03/15/2023    Hemoglobin A1c  11/15/2023    Lipid Panel  05/15/2024    Mammogram  05/26/2024    DEXA Scan  05/18/2025    Colorectal Cancer Screening  09/14/2032        Physical Exam      Vital Signs  Pulse: 74  SpO2: 97 %  BP: 130/60  BP Location: Right arm  Patient Position: Sitting  Pain Score: 0-No pain  Height and Weight  Height: 5' 5" (165.1 " cm)  Weight: 72.1 kg (158 lb 15.2 oz)  BSA (Calculated - sq m): 1.82 sq meters  BMI (Calculated): 26.5  Weight in (lb) to have BMI = 25: 149.9]    Physical Exam  Vitals reviewed.   Constitutional:       Appearance: She is well-developed.   HENT:      Head: Normocephalic and atraumatic.      Right Ear: External ear normal.      Left Ear: External ear normal.   Cardiovascular:      Rate and Rhythm: Normal rate and regular rhythm.      Heart sounds: Normal heart sounds. No murmur heard.  Pulmonary:      Effort: Pulmonary effort is normal.      Breath sounds: Normal breath sounds. No wheezing or rales.   Abdominal:      General: Bowel sounds are normal. There is no distension.      Palpations: Abdomen is soft.      Tenderness: There is no abdominal tenderness.          Laboratory:  CBC:  Recent Labs   Lab 08/04/21  0715 11/29/22  0645   WBC 6.53 5.8   RBC 4.69 4.56   Hemoglobin 13.3 12.8   Hematocrit 40.7 38.9   Platelets 319 271   MCV 87 85.3   MCH 28.4 28.1   MCHC 32.7 32.9     CMP:  Recent Labs   Lab 08/04/21  0715 05/11/22  0749 11/29/22  0645 05/15/23  0656   Glucose 119 H 128 H 111 H 110 H   Calcium 10.5 10.7 H 10.1 10.2   Albumin 4.1  --  4.1  --    ALBUMIN  --  4.3  --   --    Total Protein 7.3 6.8 6.9  --    Sodium 137 140 135 132 L   Potassium 3.0 L 3.4 L 3.5 3.2 L   CO2 32 H 30 32 30   Chloride 97 100 96 L 93 L   BUN 11 12.0 8 8   Alkaline Phosphatase 90 105  --   --    ALT 7 L 7 6  --    AST 30 18 20  --    Total Bilirubin 0.6 0.6 0.6  --      URINALYSIS:       LIPIDS:  Recent Labs   Lab 08/04/21  0715 05/11/22  0749 11/29/22  0645 05/15/23  0656   TSH 2.550  --  0.43  --    HDL 72 72 78 87   Cholesterol 256 H 241 H 257 H 251 H   Triglycerides 109 121 89 74   LDL Cholesterol 162.2 H  --  160 H 146 H   LDL Calculated  --  150 H  --   --    HDL/Cholesterol Ratio 28.1  --  3.3 2.9   Non-HDL Cholesterol 184  --   --   --    Non HDL Chol. (LDL+VLDL)  --   --  179 H 164 H   Total Cholesterol/HDL Ratio 3.6  --   --    --      TSH:  Recent Labs   Lab 08/04/21  0715 11/29/22  0645   TSH 2.550 0.43     A1C:  Recent Labs   Lab 02/10/21  1535 08/04/21  0715 05/11/22  0749 11/29/22  0645 05/15/23  0656   Hemoglobin A1C 6.5 H 6.5 H 6.6 H 6.9 H 6.5 H       Assessment/Plan     Geradline Longoria is a 73 y.o.female with:    1. Type 2 diabetes mellitus without complication, without long-term current use of insulin  - Comprehensive Metabolic Panel; Future  - Hemoglobin A1C; Future  - Microalbumin/Creatinine Ratio, Urine; Future  - Comprehensive Metabolic Panel  - Hemoglobin A1C  - Microalbumin/Creatinine Ratio, Urine  Continue current meds.  Update labs.  Eye exam UTD.  2. Essential hypertension  Continue current meds.    3. Hyperlipidemia, mixed  - Lipid Panel; Future  - Lipid Panel  Continue current meds.    4. Hypothyroidism (acquired)  - TSH; Future  - T4, Free; Future  - TSH  - T4, Free  Continue current meds.    5. AVNRT (AV aftab re-entry tachycardia)    6. Sjogren syndrome with keratoconjunctivitis  - CBC Auto Differential; Future  - CBC Auto Differential    7. Allergic rhinitis, unspecified seasonality, unspecified trigger  - levocetirizine (XYZAL) 5 MG tablet; Take 1 tablet (5 mg total) by mouth every evening.  Dispense: 30 tablet; Refill: 11    8. Skin lesion  - Ambulatory referral/consult to Dermatology; Future       Chronic conditions status updated as per HPI.  Other than changes above, cont current medications and maintain follow up with specialists.  No follow-ups on file.    No future appointments.      Brett Garrison MD  [facility]

## 2024-02-29 ENCOUNTER — TELEPHONE (OUTPATIENT)
Dept: PRIMARY CARE CLINIC | Facility: CLINIC | Age: 74
End: 2024-02-29
Payer: MEDICARE

## 2024-02-29 NOTE — TELEPHONE ENCOUNTER
----- Message from Luis Felipe Esparza sent at 2/29/2024 10:49 AM CST -----  Contact: [phone]  [email or web address]     Patient is calling for Medical Advice regarding: lab    How long has patient had these symptoms:    Pharmacy name and phone#:    Would like response via [facility]:  call back     Comments:    Pt wants to know if she can get some labs done

## 2024-03-04 ENCOUNTER — OFFICE VISIT (OUTPATIENT)
Dept: DERMATOLOGY | Facility: CLINIC | Age: 74
End: 2024-03-04
Payer: MEDICARE

## 2024-03-04 ENCOUNTER — TELEPHONE (OUTPATIENT)
Dept: DERMATOLOGY | Facility: CLINIC | Age: 74
End: 2024-03-04
Payer: MEDICARE

## 2024-03-04 DIAGNOSIS — L81.8 POST INFLAMMATORY HYPOPIGMENTATION: Primary | ICD-10-CM

## 2024-03-04 DIAGNOSIS — L82.1 SK (SEBORRHEIC KERATOSIS): ICD-10-CM

## 2024-03-04 PROCEDURE — 99202 OFFICE O/P NEW SF 15 MIN: CPT | Mod: S$GLB,,, | Performed by: DERMATOLOGY

## 2024-03-04 PROCEDURE — 99999 PR PBB SHADOW E&M-EST. PATIENT-LVL III: CPT | Mod: PBBFAC,,, | Performed by: DERMATOLOGY

## 2024-03-04 NOTE — TELEPHONE ENCOUNTER
OBSTETRICS H&P    Patient: Gela Coello Date: 2020   : 1991 Attending: Isha Gongora DO   29 year old female Admit Date: 2020      Chief Complaint     Chief Complaint   Patient presents with   • Other     transport from [place] for shortened cervix     History of Present Illness     Gela Coello is a 29 year old  female at 24w0d gestation who presents with shortened cervix and history of  labor/delivery at 25 wks. She had TVUS today for placenta previa and cervical length monitoring, which has resolved, but CL of 1.5 cm was seen at outside hospital. Cervix length was previously 3.5 cm on 20.     Denies any fevers/chills, dysuria, frequency, diarrhea. She denies any leaking of fluid, vaginal bleeding, contractions, and increased vaginal discharge.    Pregnancy Course  H/o PTD at 25 wk for  labor, on [place]  Anemia    Last Ultrasound      Date Gest Age Presentation EFW % AC % Placenta   20 24w0d Cephalic 15%, 579g   19% Posterior     Prenatal Labs  Blood type/Antibody: O+/Paige neg  Rubella: immune  HepBSAg: NR  RPR: nonreactive  GBS: negative  HIV: nonreactive  GC: negative    OB History  #: 1, Date: None, Sex: None, Weight: None, GA: None, Delivery: None, Apgar1: None, Apgar5: None, Living: None, Birth Comments: None    #: 2, Date: , Sex: None, Weight: None, GA: 8w0d, Delivery: Elective , Apgar1: None, Apgar5: None, Living: Fetal Demise, Birth Comments: None    #: 3, Date: None, Sex: None, Weight: None, GA: None, Delivery: None, Apgar1: None, Apgar5: None, Living: None, Birth Comments: None    Gyn History  History of trichomonas, treated.  Denies abnormal pap smears.     Past Medical History  Anemia - on iron    Surgical History  Past Surgical History:   Procedure Laterality Date   • D and c     • Tonsillectomy       Social History  Social History     Tobacco Use   • Smoking status: Former Smoker     Packs/day: 0.00   • Smokeless tobacco: Never Used  Pt was seen today.    ----- Message from Meredith Mcdonnell sent at 3/4/2024  1:45 PM CST -----  Regarding: Late To Appt  Contact: Pt @[phone]  Pt is calling to advise the office of running late for their appt today. Pt is still asking to be seen. Please call to advise. Thanks.              Appt time: 2:00pm         ETA: 5-10 (due tr traffic)       Substance Use Topics   • Alcohol use: Not Currently     Frequency: 2-4 times a month   • Drug use: Not Currently     Types: Marijuana     Family History  No family history on file.    Physical Exam     Vital Last Value 24 Hour Range   Temperature 98 °F (36.7 °C) Temp  Min: 98 °F (36.7 °C)  Max: 98 °F (36.7 °C)   Pulse 78 Pulse  Min: 78  Max: 78   Respiratory 15 Resp  Min: 15  Max: 15   Blood Pressure 112/63 BP  Min: 112/63  Max: 112/63   Pulse Oximetry   No data recorded   CVP   No data recorded     Vital Today Admit   Weight 75.3 kg Weight: 75.3 kg   Height N/A Height: 5' 2\" (157.5 cm)   BMI N/A BMI (Calculated): 30.36     General: well developed, well nourished. No acute distress.  Resp: Respirations are non-labored, no accessory muscle usage. No respiratory distress.  CV: Normal peripheral perfusion.  Abdomen: Soft, gravid, non-tender.  : Normal external genitalia.  Extremities: Non-tender, no edema.  Neurological: A&O x3.  Skin: Warm, dry, normal for ethnicity.   Psychiatric: Cooperative.     SVE: Per Dr. Gongora   Dilation 1 (20 1639) cm   Effacement 80    Station -3   Consistency Soft   Position         SSE: deferred    Fetal Surveillance:   Fetal Heart Rate: 155/mod/R     Uterine Activity/ Exam:  Contraction frequency (min): none    TVUS by M: Cephalic, DVP 4.6cm, CL 1.0-1.4 cm with funneling  Membrane Status: intact    Labs:  CBC and UA pending    Assessment     Gela Coello is a 29 year old  female at 24w0d who presented with shortened cervix and history of  labor, likely cervical insufficiency.    Plan     - Admit to L&D  - Draw T&S  - cEFM and [place]    Cervical Insufficiency  - H/o of PTL @ 25 wks, currently on [place]  - Shortened cervix of 1.5 cm at [facility], repeat CL of 1.0 cm here, previously 3.5 cm.   - Acontractile on toco  - Will draw CBC & UA to rule out infection  - Discussed R/B/A including risk of PPROM, PTL, bleeding, and infection.   - Plan to give Ancef 2gm and  Indocin x 24 hours tomorrow with cerclage placement  - NPO and IVFs at 125cc/hr at midnight    FWB  - cEFM and toco, currently reassuring   - BMTZ at outside hospital on , 2nd dose to be given   - Last growth US: EFW 579g, 15%ile, cephalic    Attending: [place] and seen with Dr. Rolan Martines MD  2020 4:52 PM     MFM Attending Addendum  I have seen and evaluated the patient. I agree with the findings and documentation in the resident's note except where indicated below. Key findings of the history and physical examination were repeated by myself. Patient was seen at the bedside for TVUS as above and for extensive counseling regarding options for pregnancy management.     Patient is 24w0d gestation with a mead gestation in a pregnancy complicated by history of  delivery at 25 weeks, now with cervical insufficiency. Reports normal fetal movement. Denies contractions, cramping or bleeding. No leakage of fluid. Has not experienced any cramping or back pain. No abnormal vaginal discharge.    NST: appropriate for GA  FHT: 155 bpm baseline, moderate variability, +accelerations, no decelerations  TOCO: no contractions    Recommend observation overnight to assess for uterine activity and allow patient to be NPO. If the patient has no contractions on TOCO and remains asymptomatic, then a cerclage will be offered.     We discussed that a cerclage is a procedure in which a suture is placed into the uterine cervix (usually transvaginally) to hold it closed and reinforce the competency of the cervix. In appropriately selected patients, a cervical cerclage has been shown to decrease the risk for  delivery. However, placement of a cerclage does not guarantee a successful pregnancy or a term delivery. Explained that this is a surgical procedure which is performed under anesthesia. We reviewed that risks of the procedure include infection, bleeding that requires transfusion (which includes risk for  transmission of hepatitis B or C and HIV, or transfusion reaction), injury to surrounding organs (bladder, bowel, ureters, muscle, nerves, or blood vessels), risks of anesthesia, rupture of amniotic membranes, miscarriage or  delivery.  I specifically reviewed the risk of delivery of a  that is of viable gestational age and weight and associated risks of prematurity, prolonged NICU stay, risk of cerebral palsy, risk of fetal or  death. There is also risk for the development of an infection or damage to the cervix later in pregnancy. The patient expressed clear understanding of these risks and desires cerclage placement if appropriate.    Plan to monitor overnight with continuous TOCO, NPO at midnight and re-evaluate TVUS CL and possibility of cerclage placement.     Antibiotics/Indocin course as per:   1. Isabella Batista MD, MPH; Tello Diane MD, MASOUD; Michelle Brown MD; Manish Cortes MD, MPH Indomethacin and Antibiotics in Examination-Indicated Cerclage, Obstetrics & Gynecology: 2014 - Volume 123 - Issue 6 - p 9944-0703  2. Clemente Garay MD; Shaggy Hebert MD; Isabella Batista MD, MPH; Mike Rubio MD Perioperative Use of Cefazolin and Indomethacin for Physical Examination-Indicated Cerclages to Improve Gestational Latency, Obstetrics & Gynecology: 2020 - Volume 135 - Issue 6 - p 6720-5330      Total time spent with patient: 55 minutes. Greater than 50% of the total time was spent directly face-to-face for counseling and/or coordinating care.     Isha Gongora,

## 2024-03-04 NOTE — PROGRESS NOTES
Subjective:      Patient ID:  Geraldine Longoria is a 73 y.o. female who presents for   Chief Complaint   Patient presents with    Lesion     R leg     Skin Discoloration     Arms      Patient with new complaint of lesion(s)  Location: R leg  Duration: >1yr  Symptoms: pt feels lesion has grown  Relieving factors/Previous treatments: none    Pt also c/o skin discoloration to arms.        Lesion    Skin Discoloration      Review of Systems   Skin:  Negative for daily sunscreen use and activity-related sunscreen use.   Hematologic/Lymphatic: Does not bruise/bleed easily.       Objective:   Physical Exam   Constitutional: She appears well-developed and well-nourished. No distress.   Neurological: She is alert and oriented to person, place, and time. She is not disoriented.   Psychiatric: She has a normal mood and affect.   Skin:   Areas Examined (abnormalities noted in diagram):   RUE Inspected  LUE Inspection Performed  RLE Inspected  LLE Inspection Performed            Diagram Legend     Erythematous scaling macule/papule c/w actinic keratosis       Vascular papule c/w angioma      Pigmented verrucoid papule/plaque c/w seborrheic keratosis      Yellow umbilicated papule c/w sebaceous hyperplasia      Irregularly shaped tan macule c/w lentigo     1-2 mm smooth white papules consistent with Milia      Movable subcutaneous cyst with punctum c/w epidermal inclusion cyst      Subcutaneous movable cyst c/w pilar cyst      Firm pink to brown papule c/w dermatofibroma      Pedunculated fleshy papule(s) c/w skin tag(s)      Evenly pigmented macule c/w junctional nevus     Mildly variegated pigmented, slightly irregular-bordered macule c/w mildly atypical nevus      Flesh colored to evenly pigmented papule c/w intradermal nevus       Pink pearly papule/plaque c/w basal cell carcinoma      Erythematous hyperkeratotic cursted plaque c/w SCC      Surgical scar with no sign of skin cancer recurrence      Open and closed comedones       Inflammatory papules and pustules      Verrucoid papule consistent consistent with wart     Erythematous eczematous patches and plaques     Dystrophic onycholytic nail with subungual debris c/w onychomycosis     Umbilicated papule    Erythematous-base heme-crusted tan verrucoid plaque consistent with inflamed seborrheic keratosis     Erythematous Silvery Scaling Plaque c/w Psoriasis     See annotation      Assessment / Plan:        Post inflammatory hypopigmentation v sk -   R posterior ankle  Reasurance. Monitor for new lesions or changes      SK (seborrheic keratosis)  These are benign inherited growths without a malignant potential. Reassurance given to patient. No treatment is necessary.     -     Ambulatory referral/consult to Dermatology             Follow up for PRN.

## 2024-03-15 LAB
ALBUMIN SERPL-MCNC: 4.1 G/DL (ref 3.6–5.1)
ALBUMIN/CREAT UR: NORMAL MCG/MG CREAT
ALBUMIN/GLOB SERPL: 1.4 (CALC) (ref 1–2.5)
ALP SERPL-CCNC: 109 U/L (ref 37–153)
ALT SERPL-CCNC: 8 U/L (ref 6–29)
AST SERPL-CCNC: 16 U/L (ref 10–35)
BASOPHILS # BLD AUTO: 78 CELLS/UL (ref 0–200)
BASOPHILS NFR BLD AUTO: 1.4 %
BILIRUB SERPL-MCNC: 0.5 MG/DL (ref 0.2–1.2)
BUN SERPL-MCNC: 9 MG/DL (ref 7–25)
BUN/CREAT SERPL: ABNORMAL (CALC) (ref 6–22)
CALCIUM SERPL-MCNC: 10.6 MG/DL (ref 8.6–10.4)
CHLORIDE SERPL-SCNC: 102 MMOL/L (ref 98–110)
CHOLEST SERPL-MCNC: 261 MG/DL
CHOLEST/HDLC SERPL: 3.1 (CALC)
CO2 SERPL-SCNC: 30 MMOL/L (ref 20–32)
CREAT SERPL-MCNC: 0.79 MG/DL (ref 0.6–1)
CREAT UR-MCNC: 41 MG/DL (ref 20–275)
EGFR: 79 ML/MIN/1.73M2
EOSINOPHIL # BLD AUTO: 230 CELLS/UL (ref 15–500)
EOSINOPHIL NFR BLD AUTO: 4.1 %
ERYTHROCYTE [DISTWIDTH] IN BLOOD BY AUTOMATED COUNT: 12.8 % (ref 11–15)
GLOBULIN SER CALC-MCNC: 2.9 G/DL (CALC) (ref 1.9–3.7)
GLUCOSE SERPL-MCNC: 109 MG/DL (ref 65–99)
HBA1C MFR BLD: 7.3 % OF TOTAL HGB
HCT VFR BLD AUTO: 37.4 % (ref 35–45)
HDLC SERPL-MCNC: 84 MG/DL
HGB BLD-MCNC: 12 G/DL (ref 11.7–15.5)
LDLC SERPL CALC-MCNC: 152 MG/DL (CALC)
LYMPHOCYTES # BLD AUTO: 1966 CELLS/UL (ref 850–3900)
LYMPHOCYTES NFR BLD AUTO: 35.1 %
MCH RBC QN AUTO: 26.6 PG (ref 27–33)
MCHC RBC AUTO-ENTMCNC: 32.1 G/DL (ref 32–36)
MCV RBC AUTO: 82.9 FL (ref 80–100)
MICROALBUMIN UR-MCNC: <0.2 MG/DL
MONOCYTES # BLD AUTO: 409 CELLS/UL (ref 200–950)
MONOCYTES NFR BLD AUTO: 7.3 %
NEUTROPHILS # BLD AUTO: 2918 CELLS/UL (ref 1500–7800)
NEUTROPHILS NFR BLD AUTO: 52.1 %
NONHDLC SERPL-MCNC: 177 MG/DL (CALC)
PLATELET # BLD AUTO: 295 THOUSAND/UL (ref 140–400)
PMV BLD REES-ECKER: 11.4 FL (ref 7.5–12.5)
POTASSIUM SERPL-SCNC: 3.9 MMOL/L (ref 3.5–5.3)
PROT SERPL-MCNC: 7 G/DL (ref 6.1–8.1)
RBC # BLD AUTO: 4.51 MILLION/UL (ref 3.8–5.1)
SODIUM SERPL-SCNC: 142 MMOL/L (ref 135–146)
T4 FREE SERPL-MCNC: 1.6 NG/DL (ref 0.8–1.8)
TRIGL SERPL-MCNC: 126 MG/DL
TSH SERPL-ACNC: 0.26 MIU/L (ref 0.4–4.5)
WBC # BLD AUTO: 5.6 THOUSAND/UL (ref 3.8–10.8)

## 2024-03-18 ENCOUNTER — OFFICE VISIT (OUTPATIENT)
Dept: PRIMARY CARE CLINIC | Facility: CLINIC | Age: 74
End: 2024-03-18
Payer: MEDICARE

## 2024-03-18 VITALS
HEART RATE: 83 BPM | BODY MASS INDEX: 27 KG/M2 | OXYGEN SATURATION: 97 % | DIASTOLIC BLOOD PRESSURE: 60 MMHG | HEIGHT: 65 IN | SYSTOLIC BLOOD PRESSURE: 112 MMHG | WEIGHT: 162.06 LBS

## 2024-03-18 DIAGNOSIS — M35.01 SJOGREN SYNDROME WITH KERATOCONJUNCTIVITIS: ICD-10-CM

## 2024-03-18 DIAGNOSIS — E53.8 B12 DEFICIENCY: ICD-10-CM

## 2024-03-18 DIAGNOSIS — E78.2 HYPERLIPIDEMIA, MIXED: ICD-10-CM

## 2024-03-18 DIAGNOSIS — H26.9 CATARACT, UNSPECIFIED CATARACT TYPE, UNSPECIFIED LATERALITY: ICD-10-CM

## 2024-03-18 DIAGNOSIS — E61.1 IRON DEFICIENCY: ICD-10-CM

## 2024-03-18 DIAGNOSIS — E53.8 FOLIC ACID DEFICIENCY: ICD-10-CM

## 2024-03-18 DIAGNOSIS — E11.36 TYPE 2 DIABETES MELLITUS WITH DIABETIC CATARACT, WITHOUT LONG-TERM CURRENT USE OF INSULIN: ICD-10-CM

## 2024-03-18 DIAGNOSIS — Z01.818 PREOP EXAMINATION: Primary | ICD-10-CM

## 2024-03-18 DIAGNOSIS — E11.9 TYPE 2 DIABETES MELLITUS WITHOUT COMPLICATION, WITHOUT LONG-TERM CURRENT USE OF INSULIN: ICD-10-CM

## 2024-03-18 PROCEDURE — 99214 OFFICE O/P EST MOD 30 MIN: CPT | Mod: S$GLB,,, | Performed by: INTERNAL MEDICINE

## 2024-03-18 PROCEDURE — 99999 PR PBB SHADOW E&M-EST. PATIENT-LVL III: CPT | Mod: PBBFAC,,, | Performed by: INTERNAL MEDICINE

## 2024-03-18 NOTE — PROGRESS NOTES
[facility] Note    Chief Complaint      Chief Complaint   Patient presents with    Pre-op Exam       History of Present Illness      Geraldine Longoria is a 73 y.o. female with chronic conditions of DM2, HTN, HLD, hx of SVT, hypothyroidism, sjogren's syndrome who presents today for:preop evaluation for upcoming cataract surgery.  Blood sugar control has worsened recently due to dietary changes and decrease in exercise due to plantar fasciitis.  Denies chest pain, shortness of breath.    Past Medical History:  Past Medical History:   Diagnosis Date    Thyroid disease        Past Surgical History:   has a past surgical history that includes Kidney surgery; Breast surgery (03/1994); and Cataract extraction (Right, 04/2023).    Family History:  family history includes Arthritis in her mother; Cancer in her paternal aunt; Diabetes in her sister; Hypertension in her sister.     Social History:  Social History     Tobacco Use    Smoking status: Never    Smokeless tobacco: Never   Substance Use Topics    Alcohol use: Never    Drug use: Never       I personally reviewed all past medical, surgical, social and family history.    Review of Systems   Constitutional:  Negative for chills, fever and malaise/fatigue.   Respiratory:  Negative for shortness of breath.    Cardiovascular:  Negative for chest pain.   Gastrointestinal:  Negative for constipation, diarrhea, nausea and vomiting.   Musculoskeletal:  Positive for joint pain.   Skin:  Negative for rash.   Neurological:  Positive for tingling and sensory change. Negative for weakness.   All other systems reviewed and are negative.       Medications:  Outpatient Encounter Medications as of 3/18/2024   Medication Sig Dispense Refill    amoxicillin-clavulanate 875-125mg (AUGMENTIN) 875-125 mg per tablet Take 1 tablet by mouth 2 (two) times daily. (Patient not taking: Reported on 11/14/2023) 20 tablet 0    chlorthalidone (HYGROTEN) 25 MG Tab Take 1 tablet (25 mg total)  "by mouth once daily. 90 tablet 3    levocetirizine (XYZAL) 5 MG tablet Take 1 tablet (5 mg total) by mouth every evening. 30 tablet 11    levothyroxine (SYNTHROID) 88 MCG tablet Take 1 tablet (88 mcg total) by mouth once daily. 90 tablet 3    linaCLOtide (LINZESS) 145 mcg Cap capsule Take 1 capsule (145 mcg total) by mouth before breakfast. 90 capsule 3    traMADoL (ULTRAM) 50 mg tablet Take 1 tablet (50 mg total) by mouth every 12 (twelve) hours as needed for Pain. (Patient not taking: Reported on 4/17/2023) 30 tablet 0     No facility-administered encounter medications on file as of 3/18/2024.       Allergies:  Review of patient's allergies indicates:  No Known Allergies    Health Maintenance:  Immunization History   Administered Date(s) Administered    COVID-19, MRNA, LN-S, PF (Pfizer) (Purple Cap) 04/16/2021, 05/07/2021, 12/07/2021    Td (ADULT) 10/19/2007      Health Maintenance   Topic Date Due    Hepatitis C Screening  Never done    Foot Exam  Never done    Low Dose Statin  Never done    Shingles Vaccine (1 of 2) Never done    TETANUS VACCINE  10/19/2017    Mammogram  05/26/2024    Hemoglobin A1c  09/14/2024    Eye Exam  11/16/2024    Lipid Panel  03/14/2025    DEXA Scan  05/18/2025    Colorectal Cancer Screening  09/14/2032        Physical Exam      Vital Signs  Pulse: 83  SpO2: 97 %  BP: 112/60  BP Location: Left arm  Patient Position: Sitting  Pain Score: 0-No pain  Height and Weight  Height: 5' 5" (165.1 cm)  Weight: 73.5 kg (162 lb 0.6 oz)  BSA (Calculated - sq m): 1.84 sq meters  BMI (Calculated): 27  Weight in (lb) to have BMI = 25: 149.9]    Physical Exam  Vitals reviewed.   Constitutional:       Appearance: She is well-developed.   HENT:      Head: Normocephalic and atraumatic.      Right Ear: External ear normal.      Left Ear: External ear normal.   Cardiovascular:      Rate and Rhythm: Normal rate and regular rhythm.      Heart sounds: Normal heart sounds. No murmur heard.  Pulmonary:      Effort: " Pulmonary effort is normal.      Breath sounds: Normal breath sounds. No wheezing or rales.   Abdominal:      General: Bowel sounds are normal. There is no distension.      Palpations: Abdomen is soft.      Tenderness: There is no abdominal tenderness.          Laboratory:  CBC:  Recent Labs   Lab 08/04/21  0715 11/29/22  0645 03/14/24  0749   WBC 6.53 5.8 5.6   RBC 4.69 4.56 4.51   Hemoglobin 13.3 12.8 12.0   Hematocrit 40.7 38.9 37.4   Platelets 319 271 295   MCV 87 85.3 82.9   MCH 28.4 28.1 26.6 L   MCHC 32.7 32.9 32.1     CMP:  Recent Labs   Lab 05/11/22  0749 11/29/22  0645 05/15/23  0656 03/14/24  0749   Glucose 128 H 111 H   < > 109 H   Calcium 10.7 H 10.1   < > 10.6 H   Albumin  --  4.1  --  4.1   ALBUMIN 4.3  --   --   --    Total Protein 6.8 6.9  --  7.0   Sodium 140 135   < > 142   Potassium 3.4 L 3.5   < > 3.9   CO2 30 32   < > 30   Chloride 100 96 L   < > 102   BUN 12.0 8   < > 9   Alkaline Phosphatase 105  --   --   --    ALT 7 6  --  8   AST 18 20  --  16   Total Bilirubin 0.6 0.6  --  0.5    < > = values in this interval not displayed.     URINALYSIS:       LIPIDS:  Recent Labs   Lab 08/04/21  0715 05/11/22  0749 11/29/22  0645 05/15/23  0656 03/14/24  0749   TSH 2.550  --  0.43  --  0.26 L   HDL 72   < > 78 87 84   Cholesterol 256 H   < > 257 H 251 H 261 H   Triglycerides 109   < > 89 74 126   LDL Cholesterol 162.2 H  --  160 H 146 H 152 H   LDL Calculated  --    < >  --   --   --    HDL/Cholesterol Ratio 28.1  --  3.3 2.9 3.1   Non-HDL Cholesterol 184  --   --   --   --    Non HDL Chol. (LDL+VLDL)  --   --  179 H 164 H 177 H   Total Cholesterol/HDL Ratio 3.6  --   --   --   --     < > = values in this interval not displayed.     TSH:  Recent Labs   Lab 08/04/21  0715 11/29/22  0645 03/14/24  0749   TSH 2.550 0.43 0.26 L     A1C:  Recent Labs   Lab 08/04/21  0715 05/11/22  0749 11/29/22  0645 05/15/23  0656 03/14/24  0749   Hemoglobin A1C 6.5 H 6.6 H 6.9 H 6.5 H 7.3 H       Assessment/Plan     Geraldine  Nona Pagan is a 73 y.o.female with:    1. Preop examination  2. Cataract, unspecified cataract type, unspecified laterality  Medically optimized for low risk surgery with intermediate risk procedure.    3. Type 2 diabetes mellitus without complication, without long-term current use of insulin  - Comprehensive Metabolic Panel; Future  - Hemoglobin A1C; Future  Improve diet.  Recheck in 3 months.    4. Hyperlipidemia, mixed  - Lipid Panel; Future  Continue diet.  Recheck in 3 months  5. Folic acid deficiency  - FOLATE; Future    6. Iron deficiency  - IRON AND TIBC; Future    7. B12 deficiency  - VITAMIN B12; Future       Chronic conditions status updated as per HPI.  Other than changes above, cont current medications and maintain follow up with specialists.  No follow-ups on file.    Future Appointments   Date Time Provider Department Center   6/25/2024  2:15 PM Brett Garrison MD [facility]       Brett Garrison MD  [facility]

## 2024-04-08 DIAGNOSIS — K59.00 CONSTIPATION, UNSPECIFIED CONSTIPATION TYPE: ICD-10-CM

## 2024-04-08 DIAGNOSIS — I10 ESSENTIAL HYPERTENSION: ICD-10-CM

## 2024-04-08 DIAGNOSIS — J30.9 ALLERGIC RHINITIS, UNSPECIFIED SEASONALITY, UNSPECIFIED TRIGGER: ICD-10-CM

## 2024-04-08 DIAGNOSIS — E03.9 HYPOTHYROIDISM (ACQUIRED): ICD-10-CM

## 2024-04-08 RX ORDER — CHLORTHALIDONE 25 MG/1
25 TABLET ORAL DAILY
Qty: 90 TABLET | Refills: 3 | Status: SHIPPED | OUTPATIENT
Start: 2024-04-08 | End: 2024-05-22 | Stop reason: SDUPTHER

## 2024-04-08 RX ORDER — LEVOTHYROXINE SODIUM 88 UG/1
88 TABLET ORAL DAILY
Qty: 90 TABLET | Refills: 3 | Status: SHIPPED | OUTPATIENT
Start: 2024-04-08 | End: 2024-05-22 | Stop reason: SDUPTHER

## 2024-04-08 RX ORDER — LEVOCETIRIZINE DIHYDROCHLORIDE 5 MG/1
5 TABLET, FILM COATED ORAL NIGHTLY
Qty: 30 TABLET | Refills: 11 | Status: SHIPPED | OUTPATIENT
Start: 2024-04-08 | End: 2024-05-22 | Stop reason: SDUPTHER

## 2024-04-08 NOTE — TELEPHONE ENCOUNTER
No care due was identified.  [facility] Embedded Care Due Messages. Reference number: [id number].   4/08/2024 1:13:09 PM CDT

## 2024-04-08 NOTE — TELEPHONE ENCOUNTER
----- Message from Yi Henry sent at 4/8/2024 12:01 PM CDT -----  Contact: [phone] [facility]  PHARMACY CHANGE    PLEASE SEND to [facility] NOT [facility]. Thank you.       Requesting an RX refill or new RX.  Is this a refill or new RX: new  RX name and strength (copy/paste from chart):  chlorthalidone (HYGROTEN) 25 MG Tab  Is this a 30 day or 90 day RX:   Pharmacy name and phone # (copy/paste from chart):    [id number] - [place] AT On license of [facility] & [address] [address]  Phone: [phone] Fax: [phone]          Requesting an RX refill or new RX.  Is this a refill or new RX: new  RX name and strength (copy/paste from chart):  levocetirizine (XYZAL) 5 MG tablet  Is this a 30 day or 90 day RX:   Pharmacy name and phone # (copy/paste from chart):    [facility] #[id number] - [place] AT [address] [address]  Phone: [phone] Fax: [phone]          Requesting an RX refill or new RX.  Is this a refill or new RX: new  RX name and strength (copy/paste from chart):  linaCLOtide (LINZESS) 145 mcg Cap capsule  Is this a 30 day or 90 day RX:   Pharmacy name and phone # (copy/paste from chart):    [facility] #[id number] - [employer] [address] [facility] AT [address] [address]  Phone: [phone] Fax: [phone]        Requesting an RX refill or new RX.  Is this a refill or new RX: new  RX name and strength (copy/paste from chart):  levothyroxine (SYNTHROID) 88 MCG tablet  Is this a 30 day or 90 day RX:   Pharmacy name and phone # (copy/paste from chart):    [id number] - [place] - [address] [facility] AT [facility] OF [facility] & [address]  [place] [address]  Phone: [phone] Fax:  [phone]

## 2024-05-22 DIAGNOSIS — I10 ESSENTIAL HYPERTENSION: ICD-10-CM

## 2024-05-22 DIAGNOSIS — E03.9 HYPOTHYROIDISM (ACQUIRED): ICD-10-CM

## 2024-05-22 DIAGNOSIS — J30.9 ALLERGIC RHINITIS, UNSPECIFIED SEASONALITY, UNSPECIFIED TRIGGER: ICD-10-CM

## 2024-05-22 DIAGNOSIS — K59.00 CONSTIPATION, UNSPECIFIED CONSTIPATION TYPE: ICD-10-CM

## 2024-05-22 RX ORDER — CHLORTHALIDONE 25 MG/1
25 TABLET ORAL DAILY
Qty: 90 TABLET | Refills: 3 | Status: SHIPPED | OUTPATIENT
Start: 2024-05-22 | End: 2024-06-19 | Stop reason: SDUPTHER

## 2024-05-22 RX ORDER — LEVOTHYROXINE SODIUM 88 UG/1
88 TABLET ORAL DAILY
Qty: 90 TABLET | Refills: 3 | Status: SHIPPED | OUTPATIENT
Start: 2024-05-22 | End: 2024-06-19 | Stop reason: SDUPTHER

## 2024-05-22 RX ORDER — LEVOCETIRIZINE DIHYDROCHLORIDE 5 MG/1
5 TABLET, FILM COATED ORAL NIGHTLY
Qty: 30 TABLET | Refills: 11 | Status: SHIPPED | OUTPATIENT
Start: 2024-05-22 | End: 2025-05-22

## 2024-05-22 NOTE — TELEPHONE ENCOUNTER
----- Message from Gallo Dolan sent at 5/22/2024 12:47 PM CDT -----  Contact: [phone]@patient  Requesting an RX refill or new RX.levothyroxine (SYNTHROID) 88 MCG tablet  Is this a refill or new RX: refill  RX name and strength (copy/paste from chart):    Is this a 30 day or 90 day RX:   Pharmacy name and phone #   [facility] - [address]   Phone: [phone]  The doctors have asked that we provide their patients with the following 2 reminders -- prescription refills can take up to 72 hours, and a friendly reminder that in the future you can use your [facility] account to request refills:     Requesting an RX refill or new RX.levocetirizine (XYZAL) 5 MG tablet  Is this a refill or new RX: refill  RX name and strength (copy/paste from chart):    Is this a 30 day or 90 day RX:   Pharmacy name and phone #  [facility] - [address]   Phone: [phone]  The doctors have asked that we provide their patients with the following 2 reminders -- prescription refills can take up to 72 hours, and a friendly reminder that in the future you can use your [facility] account to request refills:     Requesting an RX refill or new RX.chlorthalidone (HYGROTEN) 25 MG Tab  Is this a refill or new RX: refill  RX name and strength (copy/paste from chart):    Is this a 30 day or 90 day RX:   Pharmacy name and phone # [facility] - [place] [address] [facility]   Phone: [phone]  The doctors have asked that we provide their patients with the following 2 reminders -- prescription refills can take up to 72 hours, and a friendly reminder that in the future you can use your [facility] account to request refills:     Requesting an RX refill or new RX.linaCLOtide (LINZESS) 145 mcg Cap capsule  Is this a refill or new RX: refill  RX name and strength (copy/paste from chart):    Is  this a 30 day or 90 day RX:   Pharmacy name and phone #[facility] - [place], [place] - [address] [facility]   Phone: [phone]  The doctors have asked that we provide their patients with the following 2 reminders -- prescription refills can take up to 72 hours, and a friendly reminder that in the future you can use your [facility] account to request refills:   Neurological Neurological Neurological Neurological Neurological Neurological Neurological Neurological Neurological Neurological Neurological Neurological Neurological Neurological Neurological Neurological Neurological Neurological Neurological Neurological Neurological

## 2024-05-22 NOTE — TELEPHONE ENCOUNTER
No care due was identified.  [facility] Embedded Care Due Messages. Reference number: [id number].   5/22/2024 1:15:29 PM CDT

## 2024-06-19 DIAGNOSIS — I10 ESSENTIAL HYPERTENSION: ICD-10-CM

## 2024-06-19 DIAGNOSIS — E03.9 HYPOTHYROIDISM (ACQUIRED): ICD-10-CM

## 2024-06-19 RX ORDER — CHLORTHALIDONE 25 MG/1
25 TABLET ORAL DAILY
Qty: 90 TABLET | Refills: 1 | Status: SHIPPED | OUTPATIENT
Start: 2024-06-19

## 2024-06-19 RX ORDER — LEVOTHYROXINE SODIUM 88 UG/1
88 TABLET ORAL DAILY
Qty: 90 TABLET | Refills: 1 | Status: SHIPPED | OUTPATIENT
Start: 2024-06-19

## 2024-06-19 NOTE — TELEPHONE ENCOUNTER
No care due was identified.  [facility] Embedded Care Due Messages. Reference number: [id number].   6/19/2024 12:49:18 PM CDT

## 2024-06-20 ENCOUNTER — LAB VISIT (OUTPATIENT)
Dept: LAB | Facility: HOSPITAL | Age: 74
End: 2024-06-20
Attending: INTERNAL MEDICINE
Payer: MEDICARE

## 2024-06-20 ENCOUNTER — PATIENT MESSAGE (OUTPATIENT)
Dept: PRIMARY CARE CLINIC | Facility: CLINIC | Age: 74
End: 2024-06-20
Payer: MEDICARE

## 2024-06-20 ENCOUNTER — TELEPHONE (OUTPATIENT)
Dept: PRIMARY CARE CLINIC | Facility: CLINIC | Age: 74
End: 2024-06-20
Payer: MEDICARE

## 2024-06-20 DIAGNOSIS — E11.9 TYPE 2 DIABETES MELLITUS WITHOUT COMPLICATION, WITHOUT LONG-TERM CURRENT USE OF INSULIN: ICD-10-CM

## 2024-06-20 DIAGNOSIS — E53.8 B12 DEFICIENCY: ICD-10-CM

## 2024-06-20 DIAGNOSIS — E61.1 IRON DEFICIENCY: ICD-10-CM

## 2024-06-20 DIAGNOSIS — E53.8 FOLIC ACID DEFICIENCY: ICD-10-CM

## 2024-06-20 DIAGNOSIS — E78.2 HYPERLIPIDEMIA, MIXED: ICD-10-CM

## 2024-06-20 LAB
ALBUMIN SERPL BCP-MCNC: 3.5 G/DL (ref 3.5–5.2)
ALP SERPL-CCNC: 110 U/L (ref 55–135)
ALT SERPL W/O P-5'-P-CCNC: 6 U/L (ref 10–44)
ANION GAP SERPL CALC-SCNC: 9 MMOL/L (ref 8–16)
AST SERPL-CCNC: 21 U/L (ref 10–40)
BILIRUB SERPL-MCNC: 0.6 MG/DL (ref 0.1–1)
BUN SERPL-MCNC: 10 MG/DL (ref 8–23)
CALCIUM SERPL-MCNC: 10.1 MG/DL (ref 8.7–10.5)
CHLORIDE SERPL-SCNC: 96 MMOL/L (ref 95–110)
CHOLEST SERPL-MCNC: 254 MG/DL (ref 120–199)
CHOLEST/HDLC SERPL: 3.5 {RATIO} (ref 2–5)
CO2 SERPL-SCNC: 29 MMOL/L (ref 23–29)
CREAT SERPL-MCNC: 0.8 MG/DL (ref 0.5–1.4)
EST. GFR  (NO RACE VARIABLE): >60 ML/MIN/1.73 M^2
ESTIMATED AVG GLUCOSE: 146 MG/DL (ref 68–131)
FOLATE SERPL-MCNC: 9.4 NG/ML (ref 4–24)
GLUCOSE SERPL-MCNC: 118 MG/DL (ref 70–110)
HBA1C MFR BLD: 6.7 % (ref 4–5.6)
HDLC SERPL-MCNC: 72 MG/DL (ref 40–75)
HDLC SERPL: 28.3 % (ref 20–50)
IRON SERPL-MCNC: 73 UG/DL (ref 30–160)
LDLC SERPL CALC-MCNC: 161.8 MG/DL (ref 63–159)
NONHDLC SERPL-MCNC: 182 MG/DL
POTASSIUM SERPL-SCNC: 3 MMOL/L (ref 3.5–5.1)
PROT SERPL-MCNC: 7.1 G/DL (ref 6–8.4)
SATURATED IRON: 14 % (ref 20–50)
SODIUM SERPL-SCNC: 134 MMOL/L (ref 136–145)
TOTAL IRON BINDING CAPACITY: 509 UG/DL (ref 250–450)
TRANSFERRIN SERPL-MCNC: 344 MG/DL (ref 200–375)
TRIGL SERPL-MCNC: 101 MG/DL (ref 30–150)
VIT B12 SERPL-MCNC: 381 PG/ML (ref 210–950)

## 2024-06-20 PROCEDURE — 36415 COLL VENOUS BLD VENIPUNCTURE: CPT | Performed by: INTERNAL MEDICINE

## 2024-06-20 PROCEDURE — 82746 ASSAY OF FOLIC ACID SERUM: CPT | Performed by: INTERNAL MEDICINE

## 2024-06-20 PROCEDURE — 80053 COMPREHEN METABOLIC PANEL: CPT | Performed by: INTERNAL MEDICINE

## 2024-06-20 PROCEDURE — 83540 ASSAY OF IRON: CPT | Performed by: INTERNAL MEDICINE

## 2024-06-20 PROCEDURE — 83036 HEMOGLOBIN GLYCOSYLATED A1C: CPT | Performed by: INTERNAL MEDICINE

## 2024-06-20 PROCEDURE — 82607 VITAMIN B-12: CPT | Performed by: INTERNAL MEDICINE

## 2024-06-20 PROCEDURE — 80061 LIPID PANEL: CPT | Performed by: INTERNAL MEDICINE

## 2024-06-20 NOTE — TELEPHONE ENCOUNTER
----- Message from Diane Alidastephanie Mcguire sent at 6/20/2024 11:13 AM CDT -----  Contact: [phone]  1MEDICALADVICE     Patient is calling for Medical Advice regarding:pt is calling she received a vm to reschedule her apt for 6/25/ 2:15pm.  The next alton was in oct.  She wants something sooner than that and wants something in the pm.  Please call pt back.    How long has patient had these symptoms:    Pharmacy name and phone#:    Patient wants a call back or thru [facility]:callback    Comments:    Please advise patient replies from provider may take up to 48 hours.

## 2024-06-28 ENCOUNTER — OFFICE VISIT (OUTPATIENT)
Dept: PRIMARY CARE CLINIC | Facility: CLINIC | Age: 74
End: 2024-06-28
Payer: MEDICARE

## 2024-06-28 VITALS
SYSTOLIC BLOOD PRESSURE: 120 MMHG | DIASTOLIC BLOOD PRESSURE: 60 MMHG | OXYGEN SATURATION: 98 % | HEART RATE: 89 BPM | WEIGHT: 159.19 LBS | HEIGHT: 65 IN | BODY MASS INDEX: 26.52 KG/M2

## 2024-06-28 DIAGNOSIS — E78.2 HYPERLIPIDEMIA, MIXED: ICD-10-CM

## 2024-06-28 DIAGNOSIS — E03.9 HYPOTHYROIDISM (ACQUIRED): ICD-10-CM

## 2024-06-28 DIAGNOSIS — E11.36 TYPE 2 DIABETES MELLITUS WITH DIABETIC CATARACT, WITHOUT LONG-TERM CURRENT USE OF INSULIN: Primary | ICD-10-CM

## 2024-06-28 DIAGNOSIS — M35.01 SJOGREN SYNDROME WITH KERATOCONJUNCTIVITIS: ICD-10-CM

## 2024-06-28 DIAGNOSIS — I10 ESSENTIAL HYPERTENSION: ICD-10-CM

## 2024-06-28 DIAGNOSIS — I47.19 AVNRT (AV NODAL RE-ENTRY TACHYCARDIA): ICD-10-CM

## 2024-06-28 PROCEDURE — 99999 PR PBB SHADOW E&M-EST. PATIENT-LVL III: CPT | Mod: PBBFAC,,, | Performed by: INTERNAL MEDICINE

## 2024-06-28 NOTE — PROGRESS NOTES
[facility] Note    Chief Complaint      Chief Complaint   Patient presents with    Diabetes     3 month       History of Present Illness      Geraldine Longoria is a 73 y.o. female with chronic conditions of DM2, HTN, HLD, hx of SVT, hypothyroidism, sjogren's syndrome who presents today for: follow up chronic conditions.  DM2: A1C 6.9 last check.  Not on meds.  Eye exam 3/2022 with DR. Ndiaye.  Has appt to follow up.  HTN: BP at goal on chlorthalidone    HLD:  last check.  Hypothyroidism: Controlled on synthroid.  TSH at goal last check.  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  Has seen Cardiology previously, ablation performed.  Diet: Prepares own food mostly.  Limiting fatty foods and carbs.  Drinks plenty water.  Exercise: Stays active.  Denies drinking and driving, drinking more than 4 drinks on occasion, drug use.     Flu shot declines.  Td 2007, declines.  Pneumovax, prevnar declines.  Shingrix declines. COVID vaccine UTD  Mammogram due. Dexa scan declines.   FIT 6/2021 negative.  Cscope 2022, Dr. Patton, no polyps, 10 yr interval.      Past Medical History:  Past Medical History:   Diagnosis Date    Thyroid disease        Past Surgical History:   has a past surgical history that includes Kidney surgery; Breast surgery (03/1994); and Cataract extraction (Left, 04/2023).    Family History:  family history includes Arthritis in her mother; Cancer in her paternal aunt; Diabetes in her sister; Hypertension in her sister.     Social History:  Social History     Tobacco Use    Smoking status: Never    Smokeless tobacco: Never   Substance Use Topics    Alcohol use: Never    Drug use: Never       I personally reviewed all past medical, surgical, social and family history.    ROS     Medications:  Outpatient Encounter Medications as of 6/28/2024   Medication Sig Dispense Refill    chlorthalidone (HYGROTEN) 25 MG Tab Take 1 tablet (25 mg total) by mouth once daily. 90 tablet 1    levocetirizine  "(XYZAL) 5 MG tablet Take 1 tablet (5 mg total) by mouth every evening. 30 tablet 11    levothyroxine (SYNTHROID) 88 MCG tablet Take 1 tablet (88 mcg total) by mouth once daily. 90 tablet 1    linaCLOtide (LINZESS) 145 mcg Cap capsule Take 1 capsule (145 mcg total) by mouth before breakfast. 90 capsule 3    [DISCONTINUED] amoxicillin-clavulanate 875-125mg (AUGMENTIN) 875-125 mg per tablet Take 1 tablet by mouth 2 (two) times daily. (Patient not taking: Reported on 11/14/2023) 20 tablet 0    [DISCONTINUED] traMADoL (ULTRAM) 50 mg tablet Take 1 tablet (50 mg total) by mouth every 12 (twelve) hours as needed for Pain. (Patient not taking: Reported on 4/17/2023) 30 tablet 0     No facility-administered encounter medications on file as of 6/28/2024.       Allergies:  Review of patient's allergies indicates:  No Known Allergies    Health Maintenance:  Immunization History   Administered Date(s) Administered    COVID-19, MRNA, LN-S, PF (Pfizer) (Purple Cap) 04/16/2021, 05/07/2021, 12/07/2021    Td (ADULT) 10/19/2007      Health Maintenance   Topic Date Due    Hepatitis C Screening  Never done    Foot Exam  Never done    Low Dose Statin  Never done    Shingles Vaccine (1 of 2) Never done    TETANUS VACCINE  10/19/2017    Mammogram  05/26/2024    Eye Exam  11/16/2024    Hemoglobin A1c  12/20/2024    DEXA Scan  05/18/2025    Lipid Panel  06/20/2025    Colorectal Cancer Screening  09/14/2032        Physical Exam      Vital Signs  Pulse: 89  SpO2: 98 %  BP: 120/60  BP Location: Left arm  Patient Position: Sitting  Pain Score: 0-No pain  Height and Weight  Height: 5' 5" (165.1 cm)  Weight: 72.2 kg (159 lb 2.8 oz)  BSA (Calculated - sq m): 1.82 sq meters  BMI (Calculated): 26.5  Weight in (lb) to have BMI = 25: 149.9]    Physical Exam     Laboratory:  CBC:  Recent Labs   Lab 08/04/21  0715 11/29/22  0645 03/14/24  0749   WBC 6.53 5.8 5.6   RBC 4.69 4.56 4.51   Hemoglobin 13.3 12.8 12.0   Hematocrit 40.7 38.9 37.4   Platelets 319 " 271 295   MCV 87 85.3 82.9   MCH 28.4 28.1 26.6 L   MCHC 32.7 32.9 32.1     CMP:  Recent Labs   Lab 11/29/22 0645 05/15/23  0656 03/14/24 0749 06/20/24  0700   Glucose 111 H   < > 109 H 118 H   Calcium 10.1   < > 10.6 H 10.1   Albumin 4.1  --  4.1 3.5   Total Protein 6.9  --  7.0 7.1   Sodium 135   < > 142 134 L   Potassium 3.5   < > 3.9 3.0 L   CO2 32   < > 30 29   Chloride 96 L   < > 102 96   BUN 8   < > 9 10   Alkaline Phosphatase  --   --   --  110   ALT 6  --  8 6 L   AST 20  --  16 21   Total Bilirubin 0.6  --  0.5 0.6    < > = values in this interval not displayed.     URINALYSIS:       LIPIDS:  Recent Labs   Lab 08/04/21 0715 05/11/22 0749 11/29/22 0645 05/15/23  0656 03/14/24  0749 06/20/24  0700   TSH 2.550  --  0.43  --  0.26 L  --    HDL 72   < > 78 87 84 72   Cholesterol 256 H   < > 257 H 251 H 261 H 254 H   Triglycerides 109   < > 89 74 126 101   LDL Cholesterol 162.2 H  --  160 H 146 H 152 H 161.8 H   LDL Calculated  --    < >  --   --   --   --    HDL/Cholesterol Ratio 28.1  --  3.3 2.9 3.1 28.3   Non-HDL Cholesterol 184  --   --   --   --  182   Non HDL Chol. (LDL+VLDL)  --    < > 179 H 164 H 177 H  --    Total Cholesterol/HDL Ratio 3.6  --   --   --   --  3.5    < > = values in this interval not displayed.     TSH:  Recent Labs   Lab 08/04/21 0715 11/29/22 0645 03/14/24  0749   TSH 2.550 0.43 0.26 L     A1C:  Recent Labs   Lab 08/04/21 0715 05/11/22 0749 11/29/22 0645 05/15/23  0656 03/14/24  0749 06/20/24  0700   Hemoglobin A1C 6.5 H 6.6 H 6.9 H 6.5 H 7.3 H 6.7 H       Assessment/Plan     Geraldine Longoria is a 73 y.o.female with:    1. Type 2 diabetes mellitus with diabetic cataract, without long-term current use of insulin  Continue current meds.    2. Essential hypertension  Continue current meds.    3. AVNRT (AV aftab re-entry tachycardia)  Continue current meds.    4. Hyperlipidemia, mixed  Continue current meds.    5. Hypothyroidism (acquired)  Continue current meds.    6. Sjogren  syndrome with keratoconjunctivitis   Continue current meds.      Chronic conditions status updated as per HPI.  Other than changes above, cont current medications and maintain follow up with specialists.  Follow up in about 6 months (around 12/28/2024) for Follow up visit.    Future Appointments   Date Time Provider Department Center   12/30/2024  1:45 PM Brett Garrison MD [facility] [place]   6/23/2025  1:15 PM [facility] [place]       Brett Garrison MD  [facility]

## 2024-08-08 ENCOUNTER — TELEPHONE (OUTPATIENT)
Dept: PRIMARY CARE CLINIC | Facility: CLINIC | Age: 74
End: 2024-08-08
Payer: MEDICARE

## 2024-08-08 DIAGNOSIS — U07.1 COVID-19: Primary | ICD-10-CM

## 2024-08-08 DIAGNOSIS — U07.1 COVID-19: ICD-10-CM

## 2024-08-08 DIAGNOSIS — R05.9 COUGH, UNSPECIFIED TYPE: Primary | ICD-10-CM

## 2024-08-08 RX ORDER — NIRMATRELVIR AND RITONAVIR 300-100 MG
KIT ORAL
Qty: 30 TABLET | Refills: 0 | Status: SHIPPED | OUTPATIENT
Start: 2024-08-08

## 2024-08-09 ENCOUNTER — HOSPITAL ENCOUNTER (OUTPATIENT)
Dept: RADIOLOGY | Facility: HOSPITAL | Age: 74
Discharge: HOME | End: 2024-08-09
Attending: NURSE PRACTITIONER
Payer: MEDICARE

## 2024-08-09 DIAGNOSIS — U07.1 COVID-19: ICD-10-CM

## 2024-08-09 DIAGNOSIS — R05.9 COUGH, UNSPECIFIED TYPE: ICD-10-CM

## 2024-08-09 PROCEDURE — 71046 X-RAY EXAM CHEST 2 VIEWS: CPT | Mod: 26,,, | Performed by: RADIOLOGY

## 2024-08-09 PROCEDURE — 71046 X-RAY EXAM CHEST 2 VIEWS: CPT | Mod: TC

## 2024-09-20 LAB
CHOLEST SERPL-MSCNC: 230 MG/DL (ref 0–200)
HDLC SERPL-MCNC: 51 MG/DL (ref 35–70)
LDLC SERPL CALC-MCNC: 154 MG/DL (ref 0–160)
TRIGL SERPL-MCNC: 129 MG/DL (ref 40–160)

## 2024-11-20 ENCOUNTER — OFFICE VISIT (OUTPATIENT)
Dept: PRIMARY CARE CLINIC | Facility: CLINIC | Age: 74
End: 2024-11-20
Payer: MEDICARE

## 2024-11-20 VITALS
BODY MASS INDEX: 26.12 KG/M2 | HEIGHT: 65 IN | DIASTOLIC BLOOD PRESSURE: 80 MMHG | HEART RATE: 77 BPM | SYSTOLIC BLOOD PRESSURE: 130 MMHG | WEIGHT: 156.75 LBS | OXYGEN SATURATION: 98 %

## 2024-11-20 DIAGNOSIS — E03.9 HYPOTHYROIDISM (ACQUIRED): ICD-10-CM

## 2024-11-20 DIAGNOSIS — I87.2 CHRONIC VENOUS INSUFFICIENCY: ICD-10-CM

## 2024-11-20 DIAGNOSIS — M25.50 POLYARTHRALGIA: ICD-10-CM

## 2024-11-20 DIAGNOSIS — M35.01 SJOGREN SYNDROME WITH KERATOCONJUNCTIVITIS: ICD-10-CM

## 2024-11-20 DIAGNOSIS — I47.19 AVNRT (AV NODAL RE-ENTRY TACHYCARDIA): ICD-10-CM

## 2024-11-20 DIAGNOSIS — E11.36 TYPE 2 DIABETES MELLITUS WITH DIABETIC CATARACT, WITHOUT LONG-TERM CURRENT USE OF INSULIN: Primary | ICD-10-CM

## 2024-11-20 DIAGNOSIS — I10 ESSENTIAL HYPERTENSION: ICD-10-CM

## 2024-11-20 DIAGNOSIS — H60.90 OTITIS EXTERNA, UNSPECIFIED CHRONICITY, UNSPECIFIED LATERALITY, UNSPECIFIED TYPE: ICD-10-CM

## 2024-11-20 DIAGNOSIS — E78.2 HYPERLIPIDEMIA, MIXED: ICD-10-CM

## 2024-11-20 PROCEDURE — 99999 PR PBB SHADOW E&M-EST. PATIENT-LVL III: CPT | Mod: PBBFAC,,, | Performed by: INTERNAL MEDICINE

## 2024-11-20 PROCEDURE — 3061F NEG MICROALBUMINURIA REV: CPT | Mod: CPTII,S$GLB,, | Performed by: INTERNAL MEDICINE

## 2024-11-20 PROCEDURE — 3066F NEPHROPATHY DOC TX: CPT | Mod: CPTII,S$GLB,, | Performed by: INTERNAL MEDICINE

## 2024-11-20 PROCEDURE — 3044F HG A1C LEVEL LT 7.0%: CPT | Mod: CPTII,S$GLB,, | Performed by: INTERNAL MEDICINE

## 2024-11-20 PROCEDURE — 3008F BODY MASS INDEX DOCD: CPT | Mod: CPTII,S$GLB,, | Performed by: INTERNAL MEDICINE

## 2024-11-20 PROCEDURE — 3075F SYST BP GE 130 - 139MM HG: CPT | Mod: CPTII,S$GLB,, | Performed by: INTERNAL MEDICINE

## 2024-11-20 PROCEDURE — 3079F DIAST BP 80-89 MM HG: CPT | Mod: CPTII,S$GLB,, | Performed by: INTERNAL MEDICINE

## 2024-11-20 PROCEDURE — 99214 OFFICE O/P EST MOD 30 MIN: CPT | Mod: S$GLB,,, | Performed by: INTERNAL MEDICINE

## 2024-11-20 RX ORDER — FLUOCINOLONE ACETONIDE 0.11 MG/ML
5 OIL AURICULAR (OTIC) DAILY PRN
Qty: 20 ML | Refills: 3 | Status: SHIPPED | OUTPATIENT
Start: 2024-11-20

## 2024-11-20 RX ORDER — FUROSEMIDE 40 MG/1
40 TABLET ORAL DAILY PRN
Qty: 30 TABLET | Refills: 3 | Status: SHIPPED | OUTPATIENT
Start: 2024-11-20 | End: 2025-11-20

## 2024-11-20 NOTE — PROGRESS NOTES
"[facility] Note    Chief Complaint      Chief Complaint   Patient presents with    Diabetes     6 MONTH     Foot Pain       History of Present Illness      History of Present Illness    CHIEF COMPLAINT:  Ms. Pagan presents today for follow-up of multiple chronic conditions, including arthritis and swelling.    ARTHRITIS AND JOINT PAIN:  She reports severe arthritis pain in her joints, particularly in her feet. She has experienced significant swelling in her feet for the past six weeks, which she has been unable to reduce. The pain is "hurting like crazy," affecting her ability to walk. She received laser treatment for her arthritis while in [place], which provided temporary relief, but the pain has returned. She has undergone acupuncture sessions for pain management, which she pursued due to severe pain affecting her sleep.    SKIN ISSUES AND CHRONIC VENOUS INSUFFICIENCY:  She reports dark patches on her skin, particularly on her legs, which were not present a year ago. She has been diagnosed with chronic venous insufficiency, contributing to swelling in her legs, stasis dermatitis, and melanin deposition in certain areas of the skin. She also mentions the presence of varicose veins associated with this condition.    EAR ECZEMA:  She reports severe itching in her ear, describing it as "killing" her. She has been diagnosed with eczema in her ear canals, extending to the eardrum, causing significant discomfort. She has been using mustard oil for relief, which she finds helpful in preventing self-injury from scratching. She expresses a need for prescription medication to address the allergy-related symptoms in her ear.    SLEEP ISSUES:  She reports waking up at night due to severe thirst, requiring frequent water intake. She denies any other sleep disturbances.    POSSIBLE NEUROPATHY:  She expresses concern about possible neuropathy, though she denies experiencing specific neuropathic symptoms.    CURRENT " MEDICATIONS AND SUPPLEMENTS:  She takes chlorthalidone for blood pressure management, B12 and Vitamin D supplements, and Cetone (a fish-based supplement containing omega-3 and mucopolysaccharides). She incorporates turmeric and fenugreek seeds into her regimen, soaking them overnight and consuming in the morning, though she occasionally discontinues this practice due to perceived internal heat sensations. She also reports daily intake of moringa powder.    DIET AND LIFESTYLE:  She follows a vegetarian diet and avoids sugary drinks, only consuming plain carbonated soda water. She expresses difficulty exercising due to pain, stating she can't walk or wear shoes comfortably. She mentions gaining weight as a result of her inability to exercise, which is causing her distress.    FAMILY HISTORY:  Her mother had arthritis and experienced significant improvement in arthritis symptoms after taking an unspecified supplement for eight months.      ROS:  Musculoskeletal: +joint pain  Skin: +lesion, +itching  Psychiatric: -sleep difficulty       DM2: A1C 6.7 last check.  Not on meds.  Eye exam due with Dr. Ndiaye.    HTN: BP at goal on chlorthalidone    HLD:  last check.  Hypothyroidism: Controlled on synthroid.  TSH at goal last check.  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  Has seen Cardiology previously, ablation performed.  Flu shot declines.  Td 2007, declines.  Pneumovax, prevnar declines.  Shingrix declines. COVID vaccine UTD  Mammogram scheduled. Dexa scan declines.   FIT 6/2021 negative.  Cscope 2022, Dr. Patton, no polyps, 10 yr interval.      Assessment/Plan     Geraldine Longoria is a 74 y.o.female with:    Assessment & Plan    Assessed arthritis symptoms and lab results  Reviewed elevated uric acid levels  Considered gout but symptoms not consistent with typical presentation  Evaluated chronic venous insufficiency as cause of leg swelling and skin changes  Assessed neuropathy as possible cause of  symptoms  Considered steroid injections for joint pain if symptoms become intolerable  Diagnosed eczema in ear canals extending to eardrum    VENOUS INSUFFICIENCY AND STASIS DERMATITIS:  - Explained stasis dermatitis and its relationship to leg swelling and varicose veins.  - Discussed chronic venous insufficiency and its effects on the skin.  - Started furosemide (Lasix) to replace chlorthalidone on days when legs are very swollen.    GOUT AND URIC ACID MANAGEMENT:  - Provided information on uric acid and its relationship to diet.  - Ms. Pagan to implement low uric acid diet.    IRON DEFICIENCY:  - Recommend increasing iron intake through diet (lentils, starchy vegetables, spinach, beans).    DIETARY SUPPLEMENTS:  - Ms. Pagan to continue using moringa powder daily.  - Ms. Pgaan to continue using turmeric.  - Continued B12 supplement.  - Continued vitamin D supplement.    ECZEMA:  - Started steroid ear drops for eczema in ear canals: Apply 5 drops in affected ear(s) daily until itching subsides for a few days, then discontinue.    HYPERTENSION:  - Continued chlorthalidone for blood pressure on days when not using furosemide.    ARTHRITIS:  - Referred to a different rheumatologist for evaluation of arthritis symptoms.    FOLLOW-UP:  - Contact the office if symptoms worsen or new concerns arise.         1. Type 2 diabetes mellitus with diabetic cataract, without long-term current use of insulin  - Comprehensive Metabolic Panel; Future  - Hemoglobin A1C; Future  - Comprehensive Metabolic Panel; Future  - Hemoglobin A1C; Future    2. Essential hypertension    3. Hyperlipidemia, mixed  - Lipid Panel; Future  - Lipid Panel; Future    4. AVNRT (AV aftab re-entry tachycardia)    5. Hypothyroidism (acquired)  - TSH; Future  - T4, Free; Future    6. Sjogren syndrome with keratoconjunctivitis  - CBC Auto Differential; Future    7. Otitis externa, unspecified chronicity, unspecified laterality, unspecified type  - fluocinolone  acetonide oiL 0.01 % Drop; Place 5 drops in ear(s) daily as needed (ear eczema). (Patient not taking: Reported on 11/29/2024)  Dispense: 20 mL; Refill: 3    8. Chronic venous insufficiency  - furosemide (LASIX) 40 MG tablet; Take 1 tablet (40 mg total) by mouth daily as needed (leg swelling).  Dispense: 30 tablet; Refill: 3    9. Polyarthralgia  - Ambulatory referral/consult to Rheumatology; Future      Chronic conditions status updated as per HPI.  Other than changes above, cont current medications and maintain follow up with specialists.  No follow-ups on file.    Future Appointments   Date Time Provider Department Center   5/21/2025  2:00 PM Brett Garrison MD [facility] [place]   6/23/2025  1:15 PM [facility] [place]           Past Medical History:  Past Medical History:   Diagnosis Date    Thyroid disease        Past Surgical History:   has a past surgical history that includes Kidney surgery; Breast surgery (03/1994); and Cataract extraction (Left, 04/2023).    Family History:  family history includes Arthritis in her mother; Cancer in her paternal aunt; Diabetes in her sister; Hypertension in her sister.     Social History:  Social History     Tobacco Use    Smoking status: Never    Smokeless tobacco: Never   Substance Use Topics    Alcohol use: Never    Drug use: Never       Medications:  Outpatient Encounter Medications as of 11/20/2024   Medication Sig Dispense Refill    chlorthalidone (HYGROTEN) 25 MG Tab Take 1 tablet (25 mg total) by mouth once daily. 90 tablet 1    fluocinolone acetonide oiL 0.01 % Drop Place 5 drops in ear(s) daily as needed (ear eczema). (Patient not taking: Reported on 11/29/2024) 20 mL 3    furosemide (LASIX) 40 MG tablet Take 1 tablet (40 mg total) by mouth daily as needed (leg swelling). 30 tablet 3    levocetirizine (XYZAL) 5 MG tablet Take 1 tablet (5 mg total) by mouth every evening. 30 tablet 11    levothyroxine (SYNTHROID) 88 MCG tablet Take 1 tablet (88  "mcg total) by mouth once daily. 90 tablet 1    linaCLOtide (LINZESS) 145 mcg Cap capsule Take 1 capsule (145 mcg total) by mouth before breakfast. 90 capsule 3    nirmatrelvir-ritonavir (PAXLOVID) 300 mg (150 mg x 2)-100 mg copackaged tablets (EUA) Take 3 tablets by mouth 2 (two) times daily. Each dose contains 2 nirmatrelvir (pink tablets) and 1 ritonavir (white tablet). Take all 3 tablets together (Patient not taking: Reported on 11/29/2024) 30 tablet 0     No facility-administered encounter medications on file as of 11/20/2024.       Allergies:  Review of patient's allergies indicates:  No Known Allergies    Health Maintenance:  Immunization History   Administered Date(s) Administered    COVID-19, MRNA, LN-S, PF (Pfizer) (Purple Cap) 04/16/2021, 05/07/2021, 12/07/2021    Td (ADULT) 10/19/2007      Health Maintenance   Topic Date Due    Hepatitis C Screening  Never done    Pneumococcal Vaccines (Age 65+) (1 of 2 - PCV) Never done    Foot Exam  Never done    Low Dose Statin  Never done    Shingles Vaccine (1 of 2) Never done    RSV Vaccine (Age 60+ and Pregnant patients) (1 - Risk 60-74 years 1-dose series) Never done    TETANUS VACCINE  10/19/2017    Mammogram  05/26/2024    Influenza Vaccine (1) Never done    COVID-19 Vaccine (4 - 2024-25 season) 09/01/2024    Eye Exam  11/16/2024    Hemoglobin A1c  12/20/2024    Diabetes Urine Screening  03/14/2025    DEXA Scan  05/18/2025    Lipid Panel  06/20/2025    Colorectal Cancer Screening  09/14/2032        Physical Exam      Vital Signs  Pulse: 77  SpO2: 98 %  BP: 130/80  BP Location: Right arm  Patient Position: Sitting  Height and Weight  Height: 5' 5" (165.1 cm)  Weight: 71.1 kg (156 lb 12 oz)  BSA (Calculated - sq m): 1.81 sq meters  BMI (Calculated): 26.1  Weight in (lb) to have BMI = 25: 149.9]    Physical Exam    General: No acute distress. Well-developed. Well-nourished.  Eyes: EOMI. Sclerae anicteric.  HENT: Normocephalic. Atraumatic. Nares patent. Moist oral " mucosa.  Ears: Bilateral TMs clear. Eczema in ear canals extending to eardrum.  Cardiovascular: Regular rate. Regular rhythm. No murmurs. No rubs. No gallops. Normal S1, S2.  Respiratory: Normal respiratory effort. Clear to auscultation bilaterally. No rales. No rhonchi. No wheezing.  Abdomen: Soft. Non-tender. Non-distended. Normoactive bowel sounds.  Musculoskeletal: No  obvious deformity.  Extremities: No lower extremity edema.  Neurological: Alert & oriented x3. No slurred speech. Normal gait.  Psychiatric: Normal mood. Normal affect. Good insight. Good judgment.  Skin: Warm. Dry. No rash. Patches of dark skin on legs.         Physical Exam    Laboratory:    Results              CBC:  Recent Labs   Lab 11/29/22 0645 03/14/24  0749   WBC 5.8 5.6   RBC 4.56 4.51   Hemoglobin 12.8 12.0   Hematocrit 38.9 37.4   Platelets 271 295   MCV 85.3 82.9   MCH 28.1 26.6 L   MCHC 32.9 32.1     CMP:  Recent Labs   Lab 11/29/22  0645 05/15/23  0656 03/14/24  0749 06/20/24  0700   Glucose 111 H   < > 109 H 118 H   Calcium 10.1   < > 10.6 H 10.1   Albumin 4.1  --  4.1 3.5   Total Protein 6.9  --  7.0 7.1   Sodium 135   < > 142 134 L   Potassium 3.5   < > 3.9 3.0 L   CO2 32   < > 30 29   Chloride 96 L   < > 102 96   BUN 8   < > 9 10   Alkaline Phosphatase  --   --   --  110   ALT 6  --  8 6 L   AST 20  --  16 21   Total Bilirubin 0.6  --  0.5 0.6    < > = values in this interval not displayed.     URINALYSIS:       LIPIDS:  Recent Labs   Lab 11/29/22  0645 05/15/23  0656 03/14/24  0749 06/20/24  0700   TSH 0.43  --  0.26 L  --    HDL 78 87 84 72   Cholesterol 257 H 251 H 261 H 254 H   Triglycerides 89 74 126 101   LDL Cholesterol 160 H 146 H 152 H 161.8 H   HDL/Cholesterol Ratio 3.3 2.9 3.1 28.3   Non-HDL Cholesterol  --   --   --  182   Non HDL Chol. (LDL+VLDL) 179 H 164 H 177 H  --    Total Cholesterol/HDL Ratio  --   --   --  3.5     TSH:  Recent Labs   Lab 11/29/22  0645 03/14/24  0749   TSH 0.43 0.26 L     A1C:  Recent Labs    Lab 05/11/22  0749 11/29/22  0645 05/15/23  0656 03/14/24  0749 06/20/24  0700   Hemoglobin A1C 6.6 H 6.9 H 6.5 H 7.3 H 6.7 H           This note was generated with the assistance of ambient listening technology. Verbal consent was obtained by the patient and accompanying visitor(s) for the recording of patient appointment to facilitate this note. I attest to having reviewed and edited the generated note for accuracy, though some syntax or spelling errors may persist. Please contact the author of this note for any clarification.      Brett Garrison MD  [facility], [place]

## 2024-11-29 ENCOUNTER — OFFICE VISIT (OUTPATIENT)
Dept: FAMILY MEDICINE | Facility: CLINIC | Age: 74
End: 2024-11-29
Payer: MEDICARE

## 2024-11-29 VITALS
BODY MASS INDEX: 26.7 KG/M2 | WEIGHT: 160.25 LBS | SYSTOLIC BLOOD PRESSURE: 125 MMHG | DIASTOLIC BLOOD PRESSURE: 62 MMHG | OXYGEN SATURATION: 99 % | HEIGHT: 65 IN | HEART RATE: 77 BPM

## 2024-11-29 DIAGNOSIS — I10 ESSENTIAL HYPERTENSION: ICD-10-CM

## 2024-11-29 DIAGNOSIS — Z00.00 ENCOUNTER FOR PREVENTIVE HEALTH EXAMINATION: Primary | ICD-10-CM

## 2024-11-29 DIAGNOSIS — E03.9 HYPOTHYROIDISM (ACQUIRED): ICD-10-CM

## 2024-11-29 DIAGNOSIS — E11.36 TYPE 2 DIABETES MELLITUS WITH DIABETIC CATARACT, WITHOUT LONG-TERM CURRENT USE OF INSULIN: ICD-10-CM

## 2024-11-29 DIAGNOSIS — I47.19 AVNRT (AV NODAL RE-ENTRY TACHYCARDIA): ICD-10-CM

## 2024-11-29 DIAGNOSIS — E78.2 HYPERLIPIDEMIA, MIXED: ICD-10-CM

## 2024-11-29 DIAGNOSIS — M35.01 SJOGREN SYNDROME WITH KERATOCONJUNCTIVITIS: ICD-10-CM

## 2024-11-29 DIAGNOSIS — I47.10 SVT (SUPRAVENTRICULAR TACHYCARDIA): ICD-10-CM

## 2024-11-29 PROCEDURE — 99999 PR PBB SHADOW E&M-EST. PATIENT-LVL IV: CPT | Mod: PBBFAC,,,

## 2024-11-29 NOTE — PROGRESS NOTES
Geraldine Pagan presented for a  Medicare AWV and comprehensive Health Risk Assessment today. The following components were reviewed and updated:    Medical history  Family History  Social history  Allergies and Current Medications  Health Risk Assessment  Health Maintenance  Care Team         ** See Completed Assessments for Annual Wellness Visit within the encounter summary.**         The following assessments were completed:  Living Situation  CAGE  Depression Screening  Timed Get Up and Go  Whisper Test  Cognitive Function Screening    Nutrition Screening  ADL Screening  PAQ Screening      Opioid documentation for eAWV      Patient does not have a current opioid prescription.        Review for Substance Use Disorders: Patient does not use substance        Current Outpatient Medications:     chlorthalidone (HYGROTEN) 25 MG Tab, Take 1 tablet (25 mg total) by mouth once daily., Disp: 90 tablet, Rfl: 1    furosemide (LASIX) 40 MG tablet, Take 1 tablet (40 mg total) by mouth daily as needed (leg swelling)., Disp: 30 tablet, Rfl: 3    levocetirizine (XYZAL) 5 MG tablet, Take 1 tablet (5 mg total) by mouth every evening., Disp: 30 tablet, Rfl: 11    levothyroxine (SYNTHROID) 88 MCG tablet, Take 1 tablet (88 mcg total) by mouth once daily., Disp: 90 tablet, Rfl: 1    linaCLOtide (LINZESS) 145 mcg Cap capsule, Take 1 capsule (145 mcg total) by mouth before breakfast., Disp: 90 capsule, Rfl: 3    fluocinolone acetonide oiL 0.01 % Drop, Place 5 drops in ear(s) daily as needed (ear eczema). (Patient not taking: Reported on 11/29/2024), Disp: 20 mL, Rfl: 3    nirmatrelvir-ritonavir (PAXLOVID) 300 mg (150 mg x 2)-100 mg copackaged tablets (EUA), Take 3 tablets by mouth 2 (two) times daily. Each dose contains 2 nirmatrelvir (pink tablets) and 1 ritonavir (white tablet). Take all 3 tablets together (Patient not taking: Reported on 11/29/2024), Disp: 30 tablet, Rfl: 0       Vitals:    11/29/24 1005 11/29/24 1037   BP: (!) 144/68  "125/62   Pulse: 77    SpO2: 99%    Weight: 72.7 kg (160 lb 4.4 oz)    Height: 5' 5" (1.651 m)    PainSc:   6    PainLoc: Foot       Physical Exam  Vitals reviewed.   Constitutional:       General: She is not in acute distress.     Appearance: Normal appearance. She is not ill-appearing.   HENT:      Head: Normocephalic and atraumatic.      Nose: Nose normal.      Mouth/Throat:      Mouth: Mucous membranes are moist.   Eyes:      General: No scleral icterus.        Right eye: No discharge.         Left eye: No discharge.      Extraocular Movements: Extraocular movements intact.      Conjunctiva/sclera: Conjunctivae normal.   Cardiovascular:      Rate and Rhythm: Normal rate.   Pulmonary:      Effort: Pulmonary effort is normal. No respiratory distress.   Musculoskeletal:      Cervical back: Normal range of motion.   Skin:     General: Skin is warm and dry.   Neurological:      Mental Status: She is alert and oriented to person, place, and time.   Psychiatric:         Mood and Affect: Mood normal.         Behavior: Behavior normal. Behavior is cooperative.         Cognition and Memory: Cognition and memory normal.               Diagnoses and health risks identified today and associated recommendations/orders:    1. Encounter for preventive health examination  - Chart reviewed. Problem list updated. Discussed current medical diagnosis, current medications, medical/surgical/family/social history; updated provider list; documented vital signs; identified any cognitive impairment; and updated risk factor list. Addressed any outstanding health maintenance. Provided patient with personalized health advice. Continue to follow up with PCP and any specialists.      2. AVNRT (AV aftab re-entry tachycardia)  Chronic; stable on current treatment plan, follow up with PCP     3. SVT (supraventricular tachycardia)  Chronic; stable on current treatment plan, follow up with PCP    4. Hypothyroidism (acquired)  Chronic; stable on " current treatment plan, follow up with PCP   Taking synthroid    5. Type 2 diabetes mellitus with diabetic cataract, without long-term current use of insulin  Chronic; stable on current treatment plan, follow up with PCP    6. Sjogren syndrome with keratoconjunctivitis  Chronic; stable on current treatment plan, follow up with PCP    7. Essential hypertension  Chronic; stable on current treatment plan, follow up with PCP  Taking Chlorthalidone,    8. Hyperlipidemia, mixed  Chronic; stable on current treatment plan, follow up with PCP       Provided Geraldine with a 5-10 year written screening schedule and personal prevention plan. Recommendations were developed using the USPSTF age appropriate recommendations. Education, counseling, and referrals were provided as needed. After Visit Summary printed and given to patient which includes a list of additional screenings\tests needed.    Follow up in about 1 year (around 11/29/2025) for Your next medicare wellness visit.    RICCARDO Bailey    Advance Care Planning I offered to discuss advanced care planning, including how to pick a person who would make decisions for you if you were unable to make them for yourself, called a health care power of , and what kind of decisions you might make such as use of life sustaining treatments such as ventilators and tube feeding when faced with a life limiting illness recorded on a living will that they will need to know. (How you want to be cared for as you near the end of your natural life)       X Patient is interested in learning more about how to make advanced directives.  I provided them paperwork and offered to discuss this with them.

## 2024-11-29 NOTE — PATIENT INSTRUCTIONS
[facility] DEPARTMENT  You should receive a gift card from [facility] for completing the medicare wellness visit. You can try calling # [phone] ([facility] [employer] department) to ask about your giftcard. Please wait 1 week to call to ensure that [email or web address] has received documentation for proof of medicare visit.       Counseling and Referral of Other Preventative  (Italic type indicates deductible and co-insurance are waived)    Patient Name: Geraldine Pagan  Today's Date: 11/29/2024    Health Maintenance       Date Due Completion Date    Hepatitis C Screening Never done ---    Pneumococcal Vaccines (Age 65+) (1 of 2 - PCV) Never done ---    Foot Exam Never done ---    Low Dose Statin Never done ---    Shingles Vaccine (1 of 2) Never done ---    RSV Vaccine (Age 60+ and Pregnant patients) (1 - Risk 60-74 years 1-dose series) Never done ---    TETANUS VACCINE 10/19/2017 10/19/2007    Mammogram 05/26/2024 5/26/2023    Influenza Vaccine (1) Never done ---    COVID-19 Vaccine (4 - 2024-25 season) 09/01/2024 12/7/2021    Eye Exam 11/16/2024 11/16/2023    Hemoglobin A1c 12/20/2024 6/20/2024    Diabetes Urine Screening 03/14/2025 3/14/2024    DEXA Scan 05/18/2025 5/18/2022    Lipid Panel 06/20/2025 6/20/2024    Colorectal Cancer Screening 09/14/2032 9/14/2022        No orders of the defined types were placed in this encounter.    The following information is provided to all patients.  This information is to help you find resources for any of the problems found today that may be affecting your health:                  Living healthy guide: www.[facility].[place].gov      Understanding Diabetes: www.diabetes.org      Eating healthy: www.cdc.gov/healthyweight      CDC home safety checklist: www.cdc.gov/steadi/patient.html      Agency on Aging: www.goea.[place].gov      Alcoholics anonymous (AA): www.aa.org      Physical Activity: www.eb.nih.gov/ra8rlfz      Tobacco use: www.quitwithusla.org          Counseling and Referral of Other Preventative  (Italic type indicates deductible and co-insurance are waived)    Patient Name: Geraldine Pagan  Today's Date: 11/29/2024    Health Maintenance       Date Due Completion Date    Hepatitis C Screening Never done ---    Pneumococcal Vaccines (Age 65+) (1 of 2 - PCV) Never done ---    Foot Exam Never done ---    Low Dose Statin Never done ---    Shingles Vaccine (1 of 2) Never done ---    RSV Vaccine (Age 60+ and Pregnant patients) (1 - Risk 60-74 years 1-dose series) Never done ---    TETANUS VACCINE 10/19/2017 10/19/2007    Mammogram 05/26/2024 5/26/2023    Influenza Vaccine (1) Never done ---    COVID-19 Vaccine (4 - 2024-25 season) 09/01/2024 12/7/2021    Eye Exam 11/16/2024 11/16/2023    Hemoglobin A1c 12/20/2024 6/20/2024    Diabetes Urine Screening 03/14/2025 3/14/2024    DEXA Scan 05/18/2025 5/18/2022    Lipid Panel 06/20/2025 6/20/2024    Colorectal Cancer Screening 09/14/2032 9/14/2022        No orders of the defined types were placed in this encounter.    The following information is provided to all patients.  This information is to help you find resources for any of the problems found today that may be affecting your health:                  Living healthy guide: www.[facility].[place].[facility]      Understanding Diabetes: www.diabetes.org      Eating healthy: www.cdc.gov/healthyweight      CDC home safety checklist: www.cdc.gov/steadi/patient.html      Agency on Aging: www.goea.[place].[facility]      Alcoholics anonymous (AA): www.aa.org      Physical Activity: www.eb.nih.gov/pz2nikj      Tobacco use: www.quitwithusla.org

## 2025-01-06 ENCOUNTER — PATIENT OUTREACH (OUTPATIENT)
Dept: ADMINISTRATIVE | Facility: HOSPITAL | Age: 75
End: 2025-01-06
Payer: MEDICARE

## 2025-01-06 NOTE — PROGRESS NOTES
Health Maintenance Due   Topic Date Due    Hepatitis C Screening  Never done    Foot Exam  Never done    Pneumococcal Vaccines (Age 50+) (1 of 2 - PCV) Never done    Low Dose Statin  Never done    Shingles Vaccine (1 of 2) Never done    RSV Vaccine (Age 60+ and Pregnant patients) (1 - Risk 60-74 years 1-dose series) Never done    TETANUS VACCINE  10/19/2017    Mammogram  05/26/2024    Influenza Vaccine (1) Never done    COVID-19 Vaccine (4 - 2024-25 season) 09/01/2024    Eye Exam  11/16/2024    Hemoglobin A1c  12/20/2024     Chart review completed. HM Updated. Triggered Links. Immunizations reviewed and updated. Care Everywhere Updated. Care Team Updated.  Imported outside lipid panel results from [facility] into HM/media.

## 2025-01-17 ENCOUNTER — LAB VISIT (OUTPATIENT)
Dept: LAB | Facility: HOSPITAL | Age: 75
End: 2025-01-17
Attending: NURSE PRACTITIONER
Payer: MEDICARE

## 2025-01-17 ENCOUNTER — OFFICE VISIT (OUTPATIENT)
Dept: PRIMARY CARE CLINIC | Facility: CLINIC | Age: 75
End: 2025-01-17
Payer: MEDICARE

## 2025-01-17 VITALS
SYSTOLIC BLOOD PRESSURE: 138 MMHG | HEART RATE: 73 BPM | WEIGHT: 157.63 LBS | BODY MASS INDEX: 26.23 KG/M2 | DIASTOLIC BLOOD PRESSURE: 74 MMHG

## 2025-01-17 DIAGNOSIS — E87.6 HYPOKALEMIA: ICD-10-CM

## 2025-01-17 DIAGNOSIS — R91.1 PULMONARY NODULE: ICD-10-CM

## 2025-01-17 DIAGNOSIS — I10 ESSENTIAL HYPERTENSION: Primary | ICD-10-CM

## 2025-01-17 DIAGNOSIS — E83.42 HYPOMAGNESEMIA: ICD-10-CM

## 2025-01-17 DIAGNOSIS — E78.2 HYPERLIPIDEMIA, MIXED: ICD-10-CM

## 2025-01-17 DIAGNOSIS — E11.36 TYPE 2 DIABETES MELLITUS WITH DIABETIC CATARACT, WITHOUT LONG-TERM CURRENT USE OF INSULIN: ICD-10-CM

## 2025-01-17 LAB
ANION GAP SERPL CALC-SCNC: 14 MMOL/L (ref 8–16)
BUN SERPL-MCNC: 12 MG/DL (ref 8–23)
CALCIUM SERPL-MCNC: 10.4 MG/DL (ref 8.7–10.5)
CHLORIDE SERPL-SCNC: 97 MMOL/L (ref 95–110)
CO2 SERPL-SCNC: 25 MMOL/L (ref 23–29)
CREAT SERPL-MCNC: 1 MG/DL (ref 0.5–1.4)
EST. GFR  (NO RACE VARIABLE): 59.5 ML/MIN/1.73 M^2
GLUCOSE SERPL-MCNC: 111 MG/DL (ref 70–110)
MAGNESIUM SERPL-MCNC: 1.6 MG/DL (ref 1.6–2.6)
POTASSIUM SERPL-SCNC: 2.9 MMOL/L (ref 3.5–5.1)
SODIUM SERPL-SCNC: 136 MMOL/L (ref 136–145)

## 2025-01-17 PROCEDURE — 99214 OFFICE O/P EST MOD 30 MIN: CPT | Mod: S$GLB,,, | Performed by: NURSE PRACTITIONER

## 2025-01-17 PROCEDURE — 1159F MED LIST DOCD IN RCRD: CPT | Mod: CPTII,S$GLB,, | Performed by: NURSE PRACTITIONER

## 2025-01-17 PROCEDURE — 3078F DIAST BP <80 MM HG: CPT | Mod: CPTII,S$GLB,, | Performed by: NURSE PRACTITIONER

## 2025-01-17 PROCEDURE — 80048 BASIC METABOLIC PNL TOTAL CA: CPT | Performed by: NURSE PRACTITIONER

## 2025-01-17 PROCEDURE — 99999 PR PBB SHADOW E&M-EST. PATIENT-LVL III: CPT | Mod: PBBFAC,,, | Performed by: NURSE PRACTITIONER

## 2025-01-17 PROCEDURE — 3008F BODY MASS INDEX DOCD: CPT | Mod: CPTII,S$GLB,, | Performed by: NURSE PRACTITIONER

## 2025-01-17 PROCEDURE — 83735 ASSAY OF MAGNESIUM: CPT | Performed by: NURSE PRACTITIONER

## 2025-01-17 PROCEDURE — 3075F SYST BP GE 130 - 139MM HG: CPT | Mod: CPTII,S$GLB,, | Performed by: NURSE PRACTITIONER

## 2025-01-17 PROCEDURE — 36415 COLL VENOUS BLD VENIPUNCTURE: CPT | Performed by: NURSE PRACTITIONER

## 2025-01-17 NOTE — PROGRESS NOTES
[facility]    Chief Complaint      Chief Complaint   Patient presents with    Hypertension       History of Present Illness      Geraldine Longoria is a 74 y.o. female with chronic conditions of hypothyroid, SVT, hld who presents today for: here for ER follow up for left shoulder pain. Has home BP logs at home 150s/80s, started taking an extra Chlorthalidone BP pill at night total of 50mg. Pt went to cardiology Dr. Mohsen as follow up. Has a scheduled outpatient echo and stress test. Recommended to start Aspirin 81 mg.  Denies chest pain/sob.     Hypokalemia: was given potassium in ER. Will recheck levels lauren after doubling diuretic.  Hx of SVT; had ablation status post 5 years ago. No issues since. Previously on metoprolol.     Past Medical History:  Past Medical History:   Diagnosis Date    Thyroid disease        Past Surgical History:   has a past surgical history that includes Kidney surgery; Breast surgery (03/1994); and Cataract extraction (Left, 04/2023).    Family History:  family history includes Arthritis in her mother; Cancer in her paternal aunt; Diabetes in her sister; Hypertension in her sister.     Social History:  Social History     Tobacco Use    Smoking status: Never    Smokeless tobacco: Never   Substance Use Topics    Alcohol use: Never    Drug use: Never       Review of Systems   Constitutional:  Negative for chills and fever.   Respiratory:  Negative for cough and shortness of breath.    Cardiovascular:  Negative for chest pain and palpitations.   Gastrointestinal:  Negative for constipation, diarrhea, nausea and vomiting.   Genitourinary:  Negative for dysuria and hematuria.   Musculoskeletal:  Negative for falls.   Neurological:  Negative for headaches.        Medications:  Outpatient Encounter Medications as of 1/17/2025   Medication Sig Dispense Refill    chlorthalidone (HYGROTEN) 25 MG Tab Take 1 tablet (25 mg total) by mouth once daily. 90 tablet 1    fluocinolone  acetonide oiL 0.01 % Drop Place 5 drops in ear(s) daily as needed (ear eczema). (Patient not taking: Reported on 11/29/2024) 20 mL 3    furosemide (LASIX) 40 MG tablet Take 1 tablet (40 mg total) by mouth daily as needed (leg swelling). 30 tablet 3    levocetirizine (XYZAL) 5 MG tablet Take 1 tablet (5 mg total) by mouth every evening. 30 tablet 11    levothyroxine (SYNTHROID) 88 MCG tablet Take 1 tablet (88 mcg total) by mouth once daily. 90 tablet 1    linaCLOtide (LINZESS) 145 mcg Cap capsule Take 1 capsule (145 mcg total) by mouth before breakfast. 90 capsule 3    nirmatrelvir-ritonavir (PAXLOVID) 300 mg (150 mg x 2)-100 mg copackaged tablets (EUA) Take 3 tablets by mouth 2 (two) times daily. Each dose contains 2 nirmatrelvir (pink tablets) and 1 ritonavir (white tablet). Take all 3 tablets together (Patient not taking: Reported on 11/29/2024) 30 tablet 0     No facility-administered encounter medications on file as of 1/17/2025.       Allergies:  Review of patient's allergies indicates:   Allergen Reactions    House dust mite Shortness Of Breath    Soy protein Other (See Comments)       Health Maintenance:  Immunization History   Administered Date(s) Administered    COVID-19, MRNA, LN-S, PF (Pfizer) (Purple Cap) 04/16/2021, 05/07/2021, 12/07/2021    Td (ADULT) 10/19/2007      Health Maintenance   Topic Date Due    Hepatitis C Screening  Never done    Foot Exam  Never done    Pneumococcal Vaccines (Age 50+) (1 of 2 - PCV) Never done    Low Dose Statin  Never done    Shingles Vaccine (1 of 2) Never done    RSV Vaccine (Age 60+ and Pregnant patients) (1 - Risk 60-74 years 1-dose series) Never done    TETANUS VACCINE  10/19/2017    Mammogram  05/26/2024    Influenza Vaccine (1) Never done    COVID-19 Vaccine (4 - 2024-25 season) 09/01/2024    Diabetic Eye Exam  11/16/2024    Hemoglobin A1c  12/20/2024    Diabetes Urine Screening  03/14/2025    DEXA Scan  05/18/2025    Lipid Panel  09/20/2025    Colorectal Cancer  Screening  09/14/2032        Physical Exam      Vital Signs  Pulse: 73  BP: 138/74  BP Location: Right arm  Height and Weight  Weight: 71.5 kg (157 lb 10.1 oz)]    Physical Exam  Constitutional:       Appearance: She is well-developed.   HENT:      Head: Normocephalic and atraumatic.   Cardiovascular:      Rate and Rhythm: Normal rate and regular rhythm.      Heart sounds: Normal heart sounds. No murmur heard.  Pulmonary:      Effort: Pulmonary effort is normal. No respiratory distress.      Breath sounds: Normal breath sounds.   Abdominal:      General: There is no distension.      Palpations: Abdomen is soft.      Tenderness: There is no abdominal tenderness. There is no guarding.   Skin:     General: Skin is warm and dry.   Neurological:      Mental Status: She is alert. Mental status is at baseline.   Psychiatric:         Behavior: Behavior normal.        Laboratory:  CBC:  Recent Labs   Lab 11/29/22 0645 03/14/24  0749   WBC 5.8 5.6   RBC 4.56 4.51   Hemoglobin 12.8 12.0   Hematocrit 38.9 37.4   Platelets 271 295   MCV 85.3 82.9   MCH 28.1 26.6 L   MCHC 32.9 32.1     CMP:  Recent Labs   Lab 11/29/22 0645 05/15/23  0656 03/14/24  0749 06/20/24  0700   Glucose 111 H   < > 109 H 118 H   Calcium 10.1   < > 10.6 H 10.1   Albumin 4.1  --  4.1 3.5   Total Protein 6.9  --  7.0 7.1   Sodium 135   < > 142 134 L   Potassium 3.5   < > 3.9 3.0 L   CO2 32   < > 30 29   Chloride 96 L   < > 102 96   BUN 8   < > 9 10   Alkaline Phosphatase  --   --   --  110   ALT 6  --  8 6 L   AST 20  --  16 21   Total Bilirubin 0.6  --  0.5 0.6    < > = values in this interval not displayed.     URINALYSIS:       LIPIDS:  Recent Labs   Lab 11/29/22  0645 05/15/23  0656 03/14/24  0749 06/20/24  0700 09/20/24  0000   TSH 0.43  --  0.26 L  --   --    HDL 78 87 84 72 51   Cholesterol 257 H 251 H 261 H 254 H 230 A   Triglycerides 89 74 126 101 129   LDL Cholesterol 160 H 146 H 152 H 161.8 H  --    LDL Calculated  --   --   --   --  154    HDL/Cholesterol Ratio 3.3 2.9 3.1 28.3  --    Non-HDL Cholesterol  --   --   --  182  --    Non HDL Chol. (LDL+VLDL) 179 H 164 H 177 H  --   --    Total Cholesterol/HDL Ratio  --   --   --  3.5  --      TSH:  Recent Labs   Lab 11/29/22  0645 03/14/24  0749   TSH 0.43 0.26 L     A1C:  Recent Labs   Lab 05/11/22  0749 11/29/22  0645 05/15/23  0656 03/14/24  0749 06/20/24  0700   Hemoglobin A1C 6.6 H 6.9 H 6.5 H 7.3 H 6.7 H       Assessment/Plan     Geraldine Longoria is a 74 y.o.female with:    1. Essential hypertension  Bp at goal for now. Continue current meds.    Will check labs to see if need potassium supplement.     2. Type 2 diabetes mellitus with diabetic cataract, without long-term current use of insulin  Stable. Continue current meds.     3. Hyperlipidemia, mixed  Stable. Continue current meds.      4. Pulmonary nodule  Found on CT 2023. Solid 3 mm pulmonary  nodule.   Consider repeat CT this year.     5. Hypokalemia  - BASIC METABOLIC PANEL; Future    6. Hypomagnesemia  - Magnesium; Future       Chronic conditions status updated as per HPI.  Other than changes above, cont current medications and maintain follow up with specialists.  No follow-ups on file.    Future Appointments   Date Time Provider Department Center   5/21/2025  2:00 PM Brett Garrison MD [facility] [place]   6/23/2025  1:15 PM [facility] [place]       Adrienne Cotaya, FNP [facility]

## 2025-01-21 DIAGNOSIS — I10 ESSENTIAL HYPERTENSION: ICD-10-CM

## 2025-01-21 DIAGNOSIS — E03.9 HYPOTHYROIDISM (ACQUIRED): ICD-10-CM

## 2025-01-21 RX ORDER — CHLORTHALIDONE 25 MG/1
25 TABLET ORAL
Qty: 90 TABLET | Refills: 0 | Status: SHIPPED | OUTPATIENT
Start: 2025-01-21 | End: 2025-01-24

## 2025-01-21 RX ORDER — LEVOTHYROXINE SODIUM 88 UG/1
88 TABLET ORAL
Qty: 90 TABLET | Refills: 0 | Status: SHIPPED | OUTPATIENT
Start: 2025-01-21

## 2025-01-22 DIAGNOSIS — I10 ESSENTIAL HYPERTENSION: Primary | ICD-10-CM

## 2025-01-22 RX ORDER — AMLODIPINE BESYLATE 5 MG/1
5 TABLET ORAL DAILY
Qty: 90 TABLET | Refills: 3 | Status: SHIPPED | OUTPATIENT
Start: 2025-01-22 | End: 2026-01-22

## 2025-01-24 DIAGNOSIS — E87.6 HYPOKALEMIA: Primary | ICD-10-CM

## 2025-01-24 DIAGNOSIS — I10 ESSENTIAL HYPERTENSION: ICD-10-CM

## 2025-01-24 RX ORDER — POTASSIUM CHLORIDE 20 MEQ/1
20 TABLET, EXTENDED RELEASE ORAL DAILY
Qty: 30 TABLET | Refills: 3 | Status: SHIPPED | OUTPATIENT
Start: 2025-01-24

## 2025-01-24 RX ORDER — CHLORTHALIDONE 50 MG/1
50 TABLET ORAL DAILY
Qty: 90 TABLET | Refills: 1 | Status: SHIPPED | OUTPATIENT
Start: 2025-01-24 | End: 2026-01-24

## 2025-03-13 ENCOUNTER — TELEPHONE (OUTPATIENT)
Dept: PRIMARY CARE CLINIC | Facility: CLINIC | Age: 75
End: 2025-03-13
Payer: MEDICARE

## 2025-03-13 NOTE — TELEPHONE ENCOUNTER
----- Message from Diane sent at 3/13/2025 12:26 PM CDT -----  Contact: [phone] .1MEDICALADVICE Patient is calling for Medical Advice regarding:pt is calling she never have to request a refill.pt wants this meds to be auto refills every 90 days.chlorthalidone (HYGROTEN) 50 MG Tab 90 tabletHow long has patient had these symptoms:Pharmacy name and phone#:[facility]:callbackComments:Please advise patient replies from provider may take up to 48 hours.

## 2025-03-13 NOTE — TELEPHONE ENCOUNTER
Lm for pt    This medication does have a refill already on it, #90 was given on 01/2025 with 1 additional refill attached.     Labs were already ordered:

## 2025-03-13 NOTE — TELEPHONE ENCOUNTER
----- Message from Diane sent at 3/13/2025 12:24 PM CDT -----  Contact: [phone] .1MEDICALADVICE Patient is calling for Medical Advice regarding:Pt is calling she is ready fto schedule her labs but states she needs more that what is showing.  Please call pt back and advise of the bloodwork.How long has patient had these symptoms:Pharmacy name and phone#:[facility]:callbackComments:Please advise patient replies from provider may take up to 48 hours.

## 2025-04-16 ENCOUNTER — OFFICE VISIT (OUTPATIENT)
Dept: PRIMARY CARE CLINIC | Facility: CLINIC | Age: 75
End: 2025-04-16
Payer: MEDICARE

## 2025-04-16 ENCOUNTER — HOSPITAL ENCOUNTER (OUTPATIENT)
Dept: RADIOLOGY | Facility: HOSPITAL | Age: 75
Discharge: HOME | End: 2025-04-16
Attending: NURSE PRACTITIONER
Payer: MEDICARE

## 2025-04-16 VITALS
OXYGEN SATURATION: 99 % | SYSTOLIC BLOOD PRESSURE: 148 MMHG | HEART RATE: 83 BPM | DIASTOLIC BLOOD PRESSURE: 66 MMHG | HEIGHT: 65 IN | WEIGHT: 159.19 LBS | BODY MASS INDEX: 26.52 KG/M2

## 2025-04-16 DIAGNOSIS — I47.19 AVNRT (AV NODAL RE-ENTRY TACHYCARDIA): ICD-10-CM

## 2025-04-16 DIAGNOSIS — E87.6 HYPOKALEMIA: ICD-10-CM

## 2025-04-16 DIAGNOSIS — E61.1 IRON DEFICIENCY: ICD-10-CM

## 2025-04-16 DIAGNOSIS — M79.674 PAIN IN RIGHT TOE(S): ICD-10-CM

## 2025-04-16 DIAGNOSIS — R53.83 FATIGUE, UNSPECIFIED TYPE: Primary | ICD-10-CM

## 2025-04-16 DIAGNOSIS — E03.9 HYPOTHYROIDISM (ACQUIRED): ICD-10-CM

## 2025-04-16 DIAGNOSIS — Z78.0 POSTMENOPAUSAL: ICD-10-CM

## 2025-04-16 DIAGNOSIS — E11.36 TYPE 2 DIABETES MELLITUS WITH DIABETIC CATARACT, WITHOUT LONG-TERM CURRENT USE OF INSULIN: ICD-10-CM

## 2025-04-16 DIAGNOSIS — I10 ESSENTIAL HYPERTENSION: ICD-10-CM

## 2025-04-16 PROCEDURE — 73660 X-RAY EXAM OF TOE(S): CPT | Mod: 26,RT,, | Performed by: RADIOLOGY

## 2025-04-16 PROCEDURE — 3008F BODY MASS INDEX DOCD: CPT | Mod: CPTII,S$GLB,, | Performed by: NURSE PRACTITIONER

## 2025-04-16 PROCEDURE — 99999 PR PBB SHADOW E&M-EST. PATIENT-LVL IV: CPT | Mod: PBBFAC,,, | Performed by: NURSE PRACTITIONER

## 2025-04-16 PROCEDURE — 73660 X-RAY EXAM OF TOE(S): CPT | Mod: TC,RT

## 2025-04-16 PROCEDURE — 3044F HG A1C LEVEL LT 7.0%: CPT | Mod: CPTII,S$GLB,, | Performed by: NURSE PRACTITIONER

## 2025-04-16 PROCEDURE — 3066F NEPHROPATHY DOC TX: CPT | Mod: CPTII,S$GLB,, | Performed by: NURSE PRACTITIONER

## 2025-04-16 PROCEDURE — 3078F DIAST BP <80 MM HG: CPT | Mod: CPTII,S$GLB,, | Performed by: NURSE PRACTITIONER

## 2025-04-16 PROCEDURE — 99214 OFFICE O/P EST MOD 30 MIN: CPT | Mod: S$GLB,,, | Performed by: NURSE PRACTITIONER

## 2025-04-16 PROCEDURE — 1159F MED LIST DOCD IN RCRD: CPT | Mod: CPTII,S$GLB,, | Performed by: NURSE PRACTITIONER

## 2025-04-16 PROCEDURE — 3061F NEG MICROALBUMINURIA REV: CPT | Mod: CPTII,S$GLB,, | Performed by: NURSE PRACTITIONER

## 2025-04-16 PROCEDURE — 1160F RVW MEDS BY RX/DR IN RCRD: CPT | Mod: CPTII,S$GLB,, | Performed by: NURSE PRACTITIONER

## 2025-04-16 PROCEDURE — 3077F SYST BP >= 140 MM HG: CPT | Mod: CPTII,S$GLB,, | Performed by: NURSE PRACTITIONER

## 2025-04-16 RX ORDER — AMLODIPINE BESYLATE 5 MG/1
5 TABLET ORAL DAILY
Qty: 90 TABLET | Refills: 3 | Status: SHIPPED | OUTPATIENT
Start: 2025-04-16 | End: 2026-04-16

## 2025-04-16 NOTE — PROGRESS NOTES
[facility] Note    Chief Complaint      Chief Complaint   Patient presents with    Foot Swelling    potassium    itching ears     Bilateral        History of Present Illness      Geraldine Longoria is a 74 y.o. female with chronic conditions of htn, type 2 DM, sjorgen's syndrome, hypothyroidism, hx of svt who presents today for:complaining of fatigue for 2 months. Has been thirsty all the time for 1 month. Pt also complaining of right foot 4th toe has been chadd taping it, thinks might of fractured toe. Last Dexa scan 2022 - osteopenia, due for repeat.     Hypokalemia: last K 2.9 did not get a repeat. Completed potassium supplement has not been taking.   Hypertension: pt blood pressure not controlled. Currently on Losartan. Has not taken Amlodipine yet.   Sjorgen's syndrome, arthritis: was previously referred to a Rheumatologist, but has not scheduled yet. Previously saw Dr. Chávez did not like the office.   Hypothyroidism: currently on synthroid. Will update TSH.  SVT: previously saw Dr. Mohsen, wants to switch cardiologist. Denies chest pain/sob. Stress test 2025 unremarkable.   DM2: last A1C 6.7%, increase thirst, no unintended weight loss or hunger. Saw Dr. Ndiaye in Feb 2025.     Past Medical History:  Past Medical History:   Diagnosis Date    Thyroid disease        Past Surgical History:   has a past surgical history that includes Kidney surgery; Breast surgery (03/1994); and Cataract extraction (Left, 04/2023).    Family History:  family history includes Arthritis in her mother; Cancer in her paternal aunt; Diabetes in her sister; Hypertension in her sister.     Social History:  Social History[1]    Review of Systems   Constitutional:  Positive for malaise/fatigue. Negative for chills and fever.   Respiratory:  Negative for cough and shortness of breath.    Cardiovascular:  Negative for chest pain and palpitations.   Gastrointestinal:  Negative for constipation, diarrhea, nausea and vomiting.  "  Genitourinary:  Negative for dysuria and hematuria.   Musculoskeletal:  Positive for joint pain. Negative for falls.   Neurological:  Negative for headaches.   Endo/Heme/Allergies:  Positive for polydipsia.        Medications:  Encounter Medications[2]    Allergies:  Review of patient's allergies indicates:   Allergen Reactions    House dust mite Shortness Of Breath    Soy protein Other (See Comments)       Health Maintenance:  Immunization History   Administered Date(s) Administered    COVID-19, MRNA, LN-S, PF (Pfizer) (Purple Cap) 04/16/2021, 05/07/2021, 12/07/2021    Td (ADULT) 10/19/2007      Health Maintenance   Topic Date Due    Hepatitis C Screening  Never done    Foot Exam  Never done    Pneumococcal Vaccines (Age 50+) (1 of 2 - PCV) Never done    Low Dose Statin  Never done    Shingles Vaccine (1 of 2) Never done    RSV Vaccine (Age 60+ and Pregnant patients) (1 - Risk 60-74 years 1-dose series) Never done    TETANUS VACCINE  10/19/2017    Mammogram  05/26/2024    Influenza Vaccine (1) Never done    COVID-19 Vaccine (4 - 2024-25 season) 09/01/2024    Diabetic Eye Exam  11/16/2024    Hemoglobin A1c  12/20/2024    Diabetes Urine Screening  03/14/2025    DEXA Scan  05/18/2025    Lipid Panel  09/20/2025    Colorectal Cancer Screening  09/14/2032        Physical Exam      Vital Signs  Pulse: 83  SpO2: 99 %  BP: (!) 148/66  BP Location: Left arm  Patient Position: Sitting  Height and Weight  Height: 5' 5" (165.1 cm)  Weight: 72.2 kg (159 lb 2.8 oz)  BSA (Calculated - sq m): 1.82 sq meters  BMI (Calculated): 26.5  Weight in (lb) to have BMI = 25: 149.9]    Physical Exam  Constitutional:       Appearance: She is well-developed.   HENT:      Head: Normocephalic and atraumatic.   Cardiovascular:      Rate and Rhythm: Normal rate and regular rhythm.      Heart sounds: Normal heart sounds. No murmur heard.  Pulmonary:      Effort: Pulmonary effort is normal. No respiratory distress.      Breath sounds: Normal breath " sounds.   Abdominal:      General: There is no distension.      Palpations: Abdomen is soft.      Tenderness: There is no abdominal tenderness. There is no guarding.   Skin:     General: Skin is warm and dry.   Neurological:      Mental Status: She is alert. Mental status is at baseline.   Psychiatric:         Behavior: Behavior normal.          Laboratory:  CBC:  Recent Labs   Lab 11/29/22  0645 03/14/24  0749   WBC 5.8 5.6   RBC 4.56 4.51   Hemoglobin 12.8 12.0   Hematocrit 38.9 37.4   Platelets 271 295   MCV 85.3 82.9   MCH 28.1 26.6 L   MCHC 32.9 32.1     CMP:  Recent Labs   Lab 03/14/24  0749 06/20/24  0700 01/05/25  1443 01/17/25  1347   Glucose 109 H 118 H  --  111 H   Calcium 10.6 H 10.1 9.7 10.4   Albumin 4.1 3.5 4.3  --    Total Protein 7.0 7.1  --   --    Sodium 142 134 L 138 136   Potassium 3.9 3.0 L 2.9 L 2.9 L   CO2 30 29  --  25   Carbon Dioxide  --   --  28  --    Chloride 102 96  --  97   BUN 9 10 13.0 12   Alkaline Phosphatase  --  110  --   --    ALT 8 6 L 5  --    AST 16 21 21  --    Total Bilirubin 0.5 0.6 0.4  --      URINALYSIS:       LIPIDS:  Recent Labs   Lab 11/29/22  0645 05/15/23  0656 03/14/24  0749 06/20/24  0700 09/20/24  0000   TSH 0.43  --  0.26 L  --   --    HDL 78 87 84 72 51   Cholesterol 257 H 251 H 261 H 254 H 230 A   Triglycerides 89 74 126 101 129   LDL Cholesterol 160 H 146 H 152 H 161.8 H  --    LDL Calculated  --   --   --   --  154   HDL/Cholesterol Ratio 3.3 2.9 3.1 28.3  --    Non-HDL Cholesterol  --   --   --  182  --    Non HDL Chol. (LDL+VLDL) 179 H 164 H 177 H  --   --    Total Cholesterol/HDL Ratio  --   --   --  3.5  --      TSH:  Recent Labs   Lab 11/29/22  0645 03/14/24  0749   TSH 0.43 0.26 L     A1C:  Recent Labs   Lab 05/11/22  0749 11/29/22  0645 05/15/23  0656 03/14/24  0749 06/20/24  0700   Hemoglobin A1C 6.6 H 6.9 H 6.5 H 7.3 H 6.7 H       Assessment/Plan     Geraldine Longoria is a 74 y.o.female with:    1. Fatigue, unspecified type  - CBC Auto Differential;  Future  - Comprehensive Metabolic Panel; Future  - Hemoglobin A1C; Future  - Microalbumin/Creatinine Ratio, Urine; Future  - TSH; Future  - T4, Free; Future    2. Hypokalemia  Will recheck K level     3. Essential hypertension  - amLODIPine (NORVASC) 5 MG tablet; Take 1 tablet (5 mg total) by mouth once daily.  Dispense: 90 tablet; Refill: 3  - resent amlodipine. Check BP and send in log in 1-2 weeks.     4. AVNRT (AV aftab re-entry tachycardia)  Stable, continue follow up with cardiologist as needed.     5. Type 2 diabetes mellitus with diabetic cataract, without long-term current use of insulin  - Hemoglobin A1C; Future  - Microalbumin/Creatinine Ratio, Urine; Future    6. Pain in right toe(s)  - X-Ray Toe 2 or More Views Right; Future    7. Hypothyroidism (acquired)  - TSH; Future  - T4, Free; Future    8. Postmenopausal  - DXA Bone Density Axial Skeleton 1 or more sites; Future  - DXA Bone Density Axial Skeleton 1 or more sites    9. Iron deficiency  - Iron and TIBC; Future  - Ferritin; Future       Chronic conditions status updated as per HPI.  Other than changes above, cont current medications and maintain follow up with specialists.  No follow-ups on file.    Future Appointments   Date Time Provider Department Center   5/27/2025  2:30 PM Brett Garrison MD [facility] [place]   6/23/2025  1:15 PM [facility] [place]       Adrienne Cotaya, FNP [facility]                     [1]   Social History  Tobacco Use    Smoking status: Never    Smokeless tobacco: Never   Substance Use Topics    Alcohol use: Never    Drug use: Never   [2]   Outpatient Encounter Medications as of 4/16/2025   Medication Sig Dispense Refill    chlorthalidone (HYGROTEN) 50 MG Tab Take 1 tablet (50 mg total) by mouth once daily. 90 tablet 1    fluocinolone acetonide oiL 0.01 % Drop Place 5 drops in ear(s) daily as needed (ear eczema). 20 mL 3    furosemide (LASIX) 40 MG tablet Take 1 tablet (40 mg total) by mouth  daily as needed (leg swelling). 30 tablet 3    levocetirizine (XYZAL) 5 MG tablet Take 1 tablet (5 mg total) by mouth every evening. 30 tablet 11    levothyroxine (SYNTHROID) 88 MCG tablet TAKE 1 TABLET BY MOUTH ONCE  DAILY 90 tablet 0    linaCLOtide (LINZESS) 145 mcg Cap capsule Take 1 capsule (145 mcg total) by mouth before breakfast. 90 capsule 3    potassium chloride SA (K-DUR,KLOR-CON) 20 MEQ tablet Take 1 tablet (20 mEq total) by mouth once daily. 30 tablet 3    [DISCONTINUED] nirmatrelvir-ritonavir (PAXLOVID) 300 mg (150 mg x 2)-100 mg copackaged tablets (EUA) Take 3 tablets by mouth 2 (two) times daily. Each dose contains 2 nirmatrelvir (pink tablets) and 1 ritonavir (white tablet). Take all 3 tablets together 30 tablet 0    amLODIPine (NORVASC) 5 MG tablet Take 1 tablet (5 mg total) by mouth once daily. 90 tablet 3    [DISCONTINUED] amLODIPine (NORVASC) 5 MG tablet Take 1 tablet (5 mg total) by mouth once daily. (Patient not taking: Reported on 4/16/2025) 90 tablet 3     No facility-administered encounter medications on file as of 4/16/2025.

## 2025-04-17 ENCOUNTER — RESULTS FOLLOW-UP (OUTPATIENT)
Dept: PRIMARY CARE CLINIC | Facility: CLINIC | Age: 75
End: 2025-04-17

## 2025-05-01 DIAGNOSIS — I10 ESSENTIAL HYPERTENSION: ICD-10-CM

## 2025-05-01 DIAGNOSIS — E03.9 HYPOTHYROIDISM (ACQUIRED): ICD-10-CM

## 2025-05-02 RX ORDER — LEVOTHYROXINE SODIUM 88 UG/1
88 TABLET ORAL
Qty: 90 TABLET | Refills: 3 | Status: SHIPPED | OUTPATIENT
Start: 2025-05-02

## 2025-05-02 RX ORDER — CHLORTHALIDONE 25 MG/1
25 TABLET ORAL
Qty: 90 TABLET | Refills: 3 | Status: SHIPPED | OUTPATIENT
Start: 2025-05-02

## 2025-05-27 ENCOUNTER — TELEPHONE (OUTPATIENT)
Dept: HEMATOLOGY/ONCOLOGY | Facility: CLINIC | Age: 75
End: 2025-05-27
Payer: MEDICARE

## 2025-05-27 ENCOUNTER — OFFICE VISIT (OUTPATIENT)
Dept: PRIMARY CARE CLINIC | Facility: CLINIC | Age: 75
End: 2025-05-27
Payer: MEDICARE

## 2025-05-27 VITALS
DIASTOLIC BLOOD PRESSURE: 60 MMHG | OXYGEN SATURATION: 98 % | HEART RATE: 90 BPM | HEIGHT: 65 IN | BODY MASS INDEX: 26.67 KG/M2 | SYSTOLIC BLOOD PRESSURE: 130 MMHG | WEIGHT: 160.06 LBS

## 2025-05-27 DIAGNOSIS — E78.2 HYPERLIPIDEMIA, MIXED: ICD-10-CM

## 2025-05-27 DIAGNOSIS — L50.9 URTICARIA: ICD-10-CM

## 2025-05-27 DIAGNOSIS — D64.9 ANEMIA, UNSPECIFIED TYPE: ICD-10-CM

## 2025-05-27 DIAGNOSIS — E11.36 TYPE 2 DIABETES MELLITUS WITH DIABETIC CATARACT, WITHOUT LONG-TERM CURRENT USE OF INSULIN: Primary | ICD-10-CM

## 2025-05-27 DIAGNOSIS — I10 ESSENTIAL HYPERTENSION: ICD-10-CM

## 2025-05-27 DIAGNOSIS — L29.9 PRURITUS: ICD-10-CM

## 2025-05-27 DIAGNOSIS — E03.9 HYPOTHYROIDISM (ACQUIRED): ICD-10-CM

## 2025-05-27 DIAGNOSIS — I47.19 AVNRT (AV NODAL RE-ENTRY TACHYCARDIA): ICD-10-CM

## 2025-05-27 PROCEDURE — 99999 PR PBB SHADOW E&M-EST. PATIENT-LVL IV: CPT | Mod: PBBFAC,,, | Performed by: INTERNAL MEDICINE

## 2025-05-27 PROCEDURE — 3044F HG A1C LEVEL LT 7.0%: CPT | Mod: CPTII,S$GLB,, | Performed by: INTERNAL MEDICINE

## 2025-05-27 PROCEDURE — 3078F DIAST BP <80 MM HG: CPT | Mod: CPTII,S$GLB,, | Performed by: INTERNAL MEDICINE

## 2025-05-27 PROCEDURE — 99214 OFFICE O/P EST MOD 30 MIN: CPT | Mod: S$GLB,,, | Performed by: INTERNAL MEDICINE

## 2025-05-27 PROCEDURE — 1159F MED LIST DOCD IN RCRD: CPT | Mod: CPTII,S$GLB,, | Performed by: INTERNAL MEDICINE

## 2025-05-27 PROCEDURE — 1126F AMNT PAIN NOTED NONE PRSNT: CPT | Mod: CPTII,S$GLB,, | Performed by: INTERNAL MEDICINE

## 2025-05-27 PROCEDURE — 3008F BODY MASS INDEX DOCD: CPT | Mod: CPTII,S$GLB,, | Performed by: INTERNAL MEDICINE

## 2025-05-27 PROCEDURE — 3066F NEPHROPATHY DOC TX: CPT | Mod: CPTII,S$GLB,, | Performed by: INTERNAL MEDICINE

## 2025-05-27 PROCEDURE — 3061F NEG MICROALBUMINURIA REV: CPT | Mod: CPTII,S$GLB,, | Performed by: INTERNAL MEDICINE

## 2025-05-27 PROCEDURE — 3075F SYST BP GE 130 - 139MM HG: CPT | Mod: CPTII,S$GLB,, | Performed by: INTERNAL MEDICINE

## 2025-05-27 NOTE — PROGRESS NOTES
[facility] Note    Chief Complaint      Chief Complaint   Patient presents with    Follow-up     6 month       History of Present Illness      History of Present Illness    CHIEF COMPLAINT:  Ms. Pagan presents today with concerns about whole body itching episodes and swelling    ALLERGIC SYMPTOMS:  She experiences episodes of itching and swelling that occur year-round. Episodes begin with itching in the head, ears, and eyes, progressing to whole-body swelling, taking approximately 10 days to resolve. She takes Claritin for severe episodes and Allegra for minor symptoms. She has a known dog allergy diagnosed 35 years ago, but reports no allergic reactions despite biweekly exposure to daughter's dogs.    SLEEP:  She reports dry mouth at night requiring frequent water consumption, disrupting sleep with wakings every 30 minutes for water intake.    MUSCULOSKELETAL:  She reports limited mobility due to foot sensitivity, particularly on the soles. Her daily step count is significantly reduced to 1900 steps during an eight-hour work day.    LABS:  Laboratory studies showed low iron and magnesium levels.    SUPPLEMENTS AND ALTERNATIVE MEDICINE:  She takes natural supplements including chuck seeds and bark gum. She consumes homemade probiotics for gut health and to reduce body heat according to Ayurvedic principles.      ROS:  General: +weight gain, +upper extremity swelling  Eyes: +eye itchiness  ENT: +ear pruritus, +dry mouth  Cardiovascular: +lower extremity edema  Skin: +itching  Neurological: +difficulty falling asleep, +difficulty staying asleep, +nerve pain, +difficulty walking  Psychiatric: +sleep difficulty       DM2: A1C 6.7 last check.  Not on meds.  Eye exam due with Dr. Ndiaye.    HTN: BP at goal on chlorthalidone    HLD:  last check.  Hypothyroidism: Controlled on synthroid.  TSH at goal last check.  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  Has seen Cardiology previously, ablation  performed.  Flu shot declines.  Td 2007, declines.  Pneumovax, prevnar declines.  Shingrix declines. COVID vaccine UTD  Mammogram scheduled. Dexa scan declines.   FIT 6/2021 negative.  Cscope 2022, Dr. Patton, no polyps, 10 yr interval.      Assessment/Plan     Geraldine Longoria is a 74 y.o.female with:    Assessment & Plan    Suspect episodic itching and swelling are related to an allergic reaction, likely histamine-mediated based on response to levocetirizine.  Considered autoimmune etiology as differential diagnosis, though less likely given symptom presentation.  Iron deficiency anemia noted, first occurrence.  Ruled out concerns about dementia based on cognitive function and ability to convey information.  Venous insufficiency in lower extremities, graded as 1+ edema.  Explained potential causes of iron deficiency: blood loss, inadequate intake, or inadequate absorption.    VENOUS INSUFFICIENCY:  - Monitored the patient's swelling in the feet, which is indicative of venous insufficiency.  - Evaluated the mechanism, noting that one-way valves in veins can leak, causing blood to pool in the foot and legs, leading to swelling.  - Discussed the hereditary nature of this condition and advised the patient to wear full-length compression stockings from foot to thigh, rather than just knee sleeves, to improve circulation.  - Reviewed cosmetic options available, such as injecting veins with a sclerotic agent.    PRURITUS AND ALLERGIC REACTIONS:  - Monitored the patient's itching episodes affecting head, ears, eyes, and body, leading to swelling and discomfort.  - These episodes appear to be related to an allergic reaction, possibly histamine-related.  - Ms. Pagan currently uses Chlorpheniramine and Allegra for regular management, and levocetirizine during acute episodes.  - Ms. Pagan reports using oil to alleviate ear itching symptoms.  - Suggested allergy testing to identify potential allergens causing pruritus.    ECZEMA  (RIGHT EAR):  - Observed mild signs of eczema in the right ear during exam.    LOCALIZED EDEMA:  - Monitored the patient's swelling in the feet, graded as 1+ edema, indicating mild swelling.    IRON DEFICIENCY:  - Monitored the patient's first report of iron deficiency, with previous tests showing low iron levels.  - Evaluated potential causes, including blood loss, inadequate intake, or malabsorption.  - Ordered repeat labs, including Iron, TIBC, and reticulocyte count to investigate further.  - Referred the patient to a hematologist for evaluation of iron deficiency anemia.    MAGNESIUM DEFICIENCY:  - Monitored the patient's report of magnesium deficiency along with iron deficiency from previous tests.  - Planned to repeat labs to investigate both deficiencies.  - Referred the patient to a hematologist for further evaluation.    ALLERGIC RHINITIS (DOG ALLERGY):  - Monitored the patient's history of allergy to dogs, diagnosed 35 years ago, and experiences of allergic reactions.  - Evaluated allergic rhinitis due to animal dander as a potential cause of symptoms.  - Noted the patient uses levocetirizine during allergic episodes with good symptom relief.  - Referred the patient to an allergist for repeat allergy testing to identify all potential allergens, including animal dander.    SLEEP DISORDER:  - Monitored the patient's reports of difficulty sleeping du  e to itching episodes, affecting circadian rhythm.    SKIN SENSATION DISTURBANCE:  - Monitored the patient's experiences of itching and burning sensations during episodes, affecting skin sensation.    DIFFICULTY IN WALKING:  - Monitored the patient's reports of difficulty walking due to sensitive feet and neuropathy during episodes.    FOLLOW-UP AND ADDITIONAL TESTS:  - Ordered inflammatory markers (CRP and erythrocyte sedimentation rate) and TORI panel for autoimmune screening.  - Ms. Pagan to contact the office to obtain bone density scan results.  - Laboratory  investigations will include tests for iron and magnesium deficiencies as noted above.         1. Type 2 diabetes mellitus with diabetic cataract, without long-term current use of insulin    2. Pruritus  - Ambulatory referral/consult to Allergy; Future    3. Urticaria  - Ambulatory referral/consult to Allergy; Future  - C-Reactive Protein; Future  - Sedimentation rate; Future  - TORI Screen w/Reflex; Future    4. Anemia, unspecified type  - Ambulatory referral/consult to Hematology / Oncology; Future  - CBC Auto Differential; Future  - IRON AND TIBC; Future  - Ferritin; Future  - Reticulocytes; Future  - C-Reactive Protein; Future  - Sedimentation rate; Future  - TORI Screen w/Reflex; Future      Chronic conditions status updated as per HPI.  Other than changes above, cont current medications and maintain follow up with specialists.  No follow-ups on file.    Future Appointments   Date Time Provider Department Center   5/30/2025  2:30 PM Sadie Grossman PA-C [facility] HEM ONC [place] [facility]   6/13/2025  2:20 PM Yasmine Cartagena MD [facility] DERM [place]   6/23/2025  1:15 PM [facility] [place]   9/15/2025  2:00 PM Ricardo Holloway MD [facility] RHEUM [place] [facility]   12/1/2025  2:00 PM Brett Garrison MD [facility] [place]           Past Medical History:  Past Medical History:   Diagnosis Date    Thyroid disease        Past Surgical History:   has a past surgical history that includes Kidney surgery; Breast surgery (03/1994); and Cataract extraction (Left, 04/2023).    Family History:  family history includes Arthritis in her mother; Cancer in her paternal aunt; Diabetes in her sister; Hypertension in her sister.     Social History:  Social History[1]    Medications:  Encounter Medications[2]    Allergies:  Review of patient's allergies indicates:   Allergen Reactions    House dust mite Shortness Of Breath    Soy protein Other (See Comments)       Health Maintenance:  Immunization History   Administered Date(s)  "Administered    COVID-19, MRNA, LN-S, PF (Pfizer) (Purple Cap) 04/16/2021, 05/07/2021, 12/07/2021    Td (ADULT) 10/19/2007      Health Maintenance   Topic Date Due    Hepatitis C Screening  Never done    Foot Exam  Never done    Pneumococcal Vaccines (Age 50+) (1 of 2 - PCV) Never done    Low Dose Statin  Never done    Shingles Vaccine (1 of 2) Never done    RSV Vaccine (Age 60+ and Pregnant patients) (1 - Risk 60-74 years 1-dose series) Never done    TETANUS VACCINE  10/19/2017    Mammogram  05/26/2024    COVID-19 Vaccine (4 - 2024-25 season) 09/01/2024    Diabetic Eye Exam  11/16/2024    DEXA Scan  05/18/2025    Influenza Vaccine (Season Ended) 09/01/2025    Lipid Panel  09/20/2025    Hemoglobin A1c  10/16/2025    Diabetes Urine Screening  04/16/2026    Colorectal Cancer Screening  09/14/2032        Physical Exam      Vital Signs  Pulse: 90  SpO2: 98 %  BP: 130/60  BP Location: Left arm  Patient Position: Sitting  Pain Score: 0-No pain  Height and Weight  Height: 5' 5" (165.1 cm)  Weight: 72.6 kg (160 lb 0.9 oz)  BSA (Calculated - sq m): 1.82 sq meters  BMI (Calculated): 26.6  Weight in (lb) to have BMI = 25: 149.9]    Physical Exam    General: No acute distress. Well-developed. Well-nourished.  Eyes: EOMI. Sclerae anicteric.  HENT: Normocephalic. Atraumatic. Nares patent. Moist oral mucosa.  Ears: Bilateral TMs clear. Bilateral EACs clear. Mild eczema in right ear. No crusting or redness in left ear.  Cardiovascular: Regular rate. Regular rhythm. No murmurs. No rubs. No gallops. Normal S1, S2.  Respiratory: Normal respiratory effort. Clear to auscultation bilaterally. No rales. No rhonchi. No wheezing.  Abdomen: Soft. Non-tender. Non-distended. Normoactive bowel sounds.  Musculoskeletal: No  obvious deformity.  Extremities: 1+ pitting edema in lower extremities.  Neurological: Alert & oriented x3. No slurred speech. Normal gait.  Psychiatric: Normal mood. Normal affect. Good insight. Good judgment.  Skin: Warm. " Dry. No rash.         Physical Exam    Laboratory:    Results              CBC:  Recent Labs   Lab 11/29/22  0645 03/14/24  0749 04/16/25  1541   WBC 5.8 5.6 7.71   RBC 4.56 4.51 4.47   Hemoglobin 12.8 12.0  --    HGB  --   --  11.4 L   Hematocrit 38.9 37.4  --    HCT  --   --  36.5 L   Platelet Count  --   --  337   Platelets 271 295  --    MCV 85.3 82.9 82   MCH 28.1 26.6 L 25.5 L   MCHC 32.9 32.1 31.2 L     CMP:  Recent Labs   Lab 06/20/24  0700 01/05/25  1443 01/17/25  1347 04/16/25  1541   Glucose 118 H  --    < > 138 H   Calcium 10.1 9.7   < > 10.1   Albumin 3.5 4.3  --  3.5   Protein Total  --   --   --  7.5   Total Protein 7.1  --   --   --    Sodium 134 L 138   < > 137   Potassium 3.0 L 2.9 L   < > 4.1   CO2 29  --    < > 31 H   Carbon Dioxide  --  28  --   --    Chloride 96  --    < > 98   BUN 10 13.0   < > 12   Alkaline Phosphatase 110  --   --   --    ALP  --   --   --  118   ALT 6 L 5  --  7 L   AST 21 21  --  23   Total Bilirubin 0.6 0.4  --   --    Bilirubin Total  --   --   --  0.4    < > = values in this interval not displayed.     URINALYSIS:       LIPIDS:  Recent Labs   Lab 11/29/22  0645 05/15/23  0656 03/14/24  0749 06/20/24  0700 09/20/24  0000 04/16/25  1541   TSH 0.43  --  0.26 L  --   --  1.274   HDL 78 87 84 72 51  --    Cholesterol 257 H 251 H 261 H 254 H 230 A  --    Triglycerides 89 74 126 101 129  --    LDL Cholesterol 160 H 146 H 152 H 161.8 H  --   --    LDL Calculated  --   --   --   --  154  --    HDL/Cholesterol Ratio 3.3 2.9 3.1 28.3  --   --    Non-HDL Cholesterol  --   --   --  182  --   --    Non HDL Chol. (LDL+VLDL) 179 H 164 H 177 H  --   --   --    Total Cholesterol/HDL Ratio  --   --   --  3.5  --   --      TSH:  Recent Labs   Lab 11/29/22  0645 03/14/24  0749 04/16/25  1541   TSH 0.43 0.26 L 1.274     A1C:  Recent Labs   Lab 11/29/22  0645 05/15/23  0656 03/14/24  0749 06/20/24  0700 04/16/25  1541   Hemoglobin A1C 6.9 H 6.5 H 7.3 H 6.7 H  --    Hemoglobin A1c  --   --    --   --  6.7 H           This note was generated with the assistance of ambient listening technology. Verbal consent was obtained by the patient and accompanying visitor(s) for the recording of patient appointment to facilitate this note. I attest to having reviewed and edited the generated note for accuracy, though some syntax or spelling errors may persist. Please contact the author of this note for any clarification.      Brett Garrison MD  [facility]                       [1]   Social History  Tobacco Use    Smoking status: Never    Smokeless tobacco: Never   Substance Use Topics    Alcohol use: Never    Drug use: Never   [2]   Outpatient Encounter Medications as of 5/27/2025   Medication Sig Dispense Refill    [DISCONTINUED] chlorthalidone (HYGROTEN) 25 MG Tab TAKE 1 TABLET BY MOUTH ONCE  DAILY (Patient taking differently: Take 25 mg by mouth 2 (two) times a day.) 90 tablet 3    amLODIPine (NORVASC) 5 MG tablet Take 1 tablet (5 mg total) by mouth once daily. 90 tablet 3    chlorthalidone (HYGROTEN) 50 MG Tab Take 1 tablet (50 mg total) by mouth once daily. (Patient not taking: Reported on 5/27/2025) 90 tablet 1    fluocinolone acetonide oiL 0.01 % Drop Place 5 drops in ear(s) daily as needed (ear eczema). 20 mL 3    furosemide (LASIX) 40 MG tablet Take 1 tablet (40 mg total) by mouth daily as needed (leg swelling). 30 tablet 3    levocetirizine (XYZAL) 5 MG tablet Take 1 tablet (5 mg total) by mouth every evening. 30 tablet 11    levothyroxine (SYNTHROID) 88 MCG tablet TAKE 1 TABLET BY MOUTH ONCE  DAILY 90 tablet 3    linaCLOtide (LINZESS) 145 mcg Cap capsule Take 1 capsule (145 mcg total) by mouth before breakfast. 90 capsule 3    potassium chloride SA (K-DUR,KLOR-CON) 20 MEQ tablet Take 1 tablet (20 mEq total) by mouth once daily. 30 tablet 3    [DISCONTINUED] levothyroxine (SYNTHROID) 88 MCG tablet TAKE 1 TABLET BY MOUTH ONCE  DAILY 90 tablet 0     No facility-administered encounter medications  on file as of 5/27/2025.

## 2025-05-29 ENCOUNTER — LAB VISIT (OUTPATIENT)
Dept: LAB | Facility: HOSPITAL | Age: 75
End: 2025-05-29
Attending: INTERNAL MEDICINE
Payer: MEDICARE

## 2025-05-29 DIAGNOSIS — I10 ESSENTIAL HYPERTENSION: ICD-10-CM

## 2025-05-29 DIAGNOSIS — L50.9 URTICARIA: ICD-10-CM

## 2025-05-29 DIAGNOSIS — D64.9 ANEMIA, UNSPECIFIED TYPE: ICD-10-CM

## 2025-05-29 LAB
ABSOLUTE EOSINOPHIL (OHS): 0.19 K/UL
ABSOLUTE MONOCYTE (OHS): 0.55 K/UL (ref 0.3–1)
ABSOLUTE NEUTROPHIL COUNT (OHS): 3.81 K/UL (ref 1.8–7.7)
BASOPHILS # BLD AUTO: 0.07 K/UL
BASOPHILS NFR BLD AUTO: 1 %
CRP SERPL-MCNC: 3.3 MG/L
ERYTHROCYTE [DISTWIDTH] IN BLOOD BY AUTOMATED COUNT: 13.4 % (ref 11.5–14.5)
ERYTHROCYTE [SEDIMENTATION RATE] IN BLOOD BY PHOTOMETRIC METHOD: 31 MM/HR
FERRITIN SERPL-MCNC: 11 NG/ML (ref 20–300)
HCT VFR BLD AUTO: 35.8 % (ref 37–48.5)
HGB BLD-MCNC: 10.9 GM/DL (ref 12–16)
IMM GRANULOCYTES # BLD AUTO: 0.01 K/UL (ref 0–0.04)
IMM GRANULOCYTES NFR BLD AUTO: 0.1 % (ref 0–0.5)
IRON SATN MFR SERPL: 14 % (ref 20–50)
IRON SERPL-MCNC: 66 UG/DL (ref 30–160)
LYMPHOCYTES # BLD AUTO: 2.11 K/UL (ref 1–4.8)
MCH RBC QN AUTO: 24.9 PG (ref 27–31)
MCHC RBC AUTO-ENTMCNC: 30.4 G/DL (ref 32–36)
MCV RBC AUTO: 82 FL (ref 82–98)
NUCLEATED RBC (/100WBC) (OHS): 0 /100 WBC
PLATELET # BLD AUTO: 323 K/UL (ref 150–450)
PMV BLD AUTO: 11.1 FL (ref 9.2–12.9)
RBC # BLD AUTO: 4.37 M/UL (ref 4–5.4)
RELATIVE EOSINOPHIL (OHS): 2.8 %
RELATIVE LYMPHOCYTE (OHS): 31.3 % (ref 18–48)
RELATIVE MONOCYTE (OHS): 8.2 % (ref 4–15)
RELATIVE NEUTROPHIL (OHS): 56.6 % (ref 38–73)
RETICS/RBC NFR AUTO: 1.8 % (ref 0.5–2.5)
TIBC SERPL-MCNC: 462 UG/DL (ref 250–450)
TRANSFERRIN SERPL-MCNC: 312 MG/DL (ref 200–375)
WBC # BLD AUTO: 6.74 K/UL (ref 3.9–12.7)

## 2025-05-29 PROCEDURE — 82728 ASSAY OF FERRITIN: CPT

## 2025-05-29 PROCEDURE — 85025 COMPLETE CBC W/AUTO DIFF WBC: CPT

## 2025-05-29 PROCEDURE — 86038 ANTINUCLEAR ANTIBODIES: CPT

## 2025-05-29 PROCEDURE — 85045 AUTOMATED RETICULOCYTE COUNT: CPT

## 2025-05-29 PROCEDURE — 84466 ASSAY OF TRANSFERRIN: CPT

## 2025-05-29 PROCEDURE — 85652 RBC SED RATE AUTOMATED: CPT

## 2025-05-29 PROCEDURE — 86140 C-REACTIVE PROTEIN: CPT

## 2025-05-29 PROCEDURE — 36415 COLL VENOUS BLD VENIPUNCTURE: CPT

## 2025-05-29 RX ORDER — CHLORTHALIDONE 25 MG/1
25 TABLET ORAL 2 TIMES DAILY
Qty: 180 TABLET | Refills: 3 | Status: SHIPPED | OUTPATIENT
Start: 2025-05-29

## 2025-05-29 NOTE — TELEPHONE ENCOUNTER
No care due was identified.  [facility] Embedded Care Due Messages. Reference number: [id number].   5/29/2025 8:57:44 AM CDT

## 2025-05-30 ENCOUNTER — RESULTS FOLLOW-UP (OUTPATIENT)
Dept: PRIMARY CARE CLINIC | Facility: CLINIC | Age: 75
End: 2025-05-30

## 2025-05-30 LAB — ANA (OHS): NORMAL

## 2025-05-30 NOTE — PROGRESS NOTES
Labs show significant iron deficient, but not worse than last month.  Recommend increasing iron intake either from diet or supplements (like ferrous gluconate)

## 2025-06-06 ENCOUNTER — PATIENT MESSAGE (OUTPATIENT)
Dept: ADMINISTRATIVE | Facility: HOSPITAL | Age: 75
End: 2025-06-06
Payer: MEDICARE

## 2025-06-11 ENCOUNTER — TELEPHONE (OUTPATIENT)
Dept: ALLERGY | Facility: CLINIC | Age: 75
End: 2025-06-11
Payer: MEDICARE

## 2025-06-12 ENCOUNTER — OFFICE VISIT (OUTPATIENT)
Dept: PRIMARY CARE CLINIC | Facility: CLINIC | Age: 75
End: 2025-06-12
Payer: MEDICARE

## 2025-06-12 VITALS
BODY MASS INDEX: 26.81 KG/M2 | DIASTOLIC BLOOD PRESSURE: 68 MMHG | WEIGHT: 160.94 LBS | HEIGHT: 65 IN | OXYGEN SATURATION: 98 % | SYSTOLIC BLOOD PRESSURE: 128 MMHG | HEART RATE: 86 BPM

## 2025-06-12 DIAGNOSIS — E87.6 HYPOKALEMIA: Primary | ICD-10-CM

## 2025-06-12 DIAGNOSIS — E61.1 IRON DEFICIENCY: ICD-10-CM

## 2025-06-12 DIAGNOSIS — D64.9 ANEMIA, UNSPECIFIED TYPE: ICD-10-CM

## 2025-06-12 DIAGNOSIS — R91.1 SOLITARY PULMONARY NODULE: ICD-10-CM

## 2025-06-12 PROCEDURE — 3074F SYST BP LT 130 MM HG: CPT | Mod: CPTII,S$GLB,, | Performed by: NURSE PRACTITIONER

## 2025-06-12 PROCEDURE — 3078F DIAST BP <80 MM HG: CPT | Mod: CPTII,S$GLB,, | Performed by: NURSE PRACTITIONER

## 2025-06-12 PROCEDURE — 99214 OFFICE O/P EST MOD 30 MIN: CPT | Mod: S$GLB,,, | Performed by: NURSE PRACTITIONER

## 2025-06-12 PROCEDURE — 3061F NEG MICROALBUMINURIA REV: CPT | Mod: CPTII,S$GLB,, | Performed by: NURSE PRACTITIONER

## 2025-06-12 PROCEDURE — 3066F NEPHROPATHY DOC TX: CPT | Mod: CPTII,S$GLB,, | Performed by: NURSE PRACTITIONER

## 2025-06-12 PROCEDURE — 3008F BODY MASS INDEX DOCD: CPT | Mod: CPTII,S$GLB,, | Performed by: NURSE PRACTITIONER

## 2025-06-12 PROCEDURE — 1159F MED LIST DOCD IN RCRD: CPT | Mod: CPTII,S$GLB,, | Performed by: NURSE PRACTITIONER

## 2025-06-12 PROCEDURE — 99999 PR PBB SHADOW E&M-EST. PATIENT-LVL IV: CPT | Mod: PBBFAC,,, | Performed by: NURSE PRACTITIONER

## 2025-06-12 PROCEDURE — 3044F HG A1C LEVEL LT 7.0%: CPT | Mod: CPTII,S$GLB,, | Performed by: NURSE PRACTITIONER

## 2025-06-12 NOTE — PROGRESS NOTES
[facility] Note    Chief Complaint      Chief Complaint   Patient presents with    Follow-up    Abdominal Pain     Upper left side        History of Present Illness      Geraldine Longoria is a 74 y.o. female with chronic conditions of type 2 DM, hypothyroidism, htn hld, sjorgen syndrome,  who presents today for: here complaining of fatigue for 1 month. Recent iron panel shows iron saturation 14%, ferritin 11%, CBC shows worsening microcytic anemia h/h 10.9/35.8. She wants to continue to take vegan iron supplementation. No overt signs of bleeding. Denies melena/hematochezia. Cscope UTD from 2022, 10 year interval. No change in bowel movement.    Denies heartburn. Now notice worsening abdominal bloating. No abdominal pain. Normal BM. Denies blood in stool. No nausea/ vomiting. No fever or chills.       Past Medical History:  Past Medical History:   Diagnosis Date    Thyroid disease        Past Surgical History:   has a past surgical history that includes Kidney surgery; Breast surgery (03/1994); and Cataract extraction (Left, 04/2023).    Family History:  family history includes Arthritis in her mother; Cancer in her paternal aunt; Diabetes in her sister; Hypertension in her sister.     Social History:  Social History[1]    Review of Systems   Constitutional:  Positive for malaise/fatigue. Negative for chills and fever.   Respiratory:  Negative for cough and shortness of breath.    Cardiovascular:  Negative for chest pain and palpitations.   Gastrointestinal:  Negative for constipation, diarrhea, nausea and vomiting.   Genitourinary:  Negative for dysuria and hematuria.   Musculoskeletal:  Negative for falls.   Neurological:  Negative for headaches.        Medications:  Encounter Medications[2]    Allergies:  Review of patient's allergies indicates:   Allergen Reactions    House dust mite Shortness Of Breath    Soy protein Other (See Comments)       Health Maintenance:  Immunization History   Administered  "Date(s) Administered    COVID-19, MRNA, LN-S, PF (Pfizer) (Purple Cap) 04/16/2021, 05/07/2021, 12/07/2021    Td (ADULT) 10/19/2007      Health Maintenance   Topic Date Due    Hepatitis C Screening  Never done    Foot Exam  Never done    Pneumococcal Vaccines (Age 50+) (1 of 2 - PCV) Never done    Low Dose Statin  Never done    Shingles Vaccine (1 of 2) Never done    RSV Vaccine (Age 60+ and Pregnant patients) (1 - Risk 60-74 years 1-dose series) Never done    TETANUS VACCINE  10/19/2017    Mammogram  05/26/2024    COVID-19 Vaccine (4 - 2024-25 season) 09/01/2024    Influenza Vaccine (Season Ended) 09/01/2025    Lipid Panel  09/20/2025    Hemoglobin A1c  10/16/2025    Diabetic Eye Exam  02/06/2026    Diabetes Urine Screening  04/16/2026    DEXA Scan  04/29/2030    Colorectal Cancer Screening  09/14/2032        Physical Exam      Vital Signs  Pulse: 86  SpO2: 98 %  BP: 128/68  BP Location: Left arm  Patient Position: Sitting  Height and Weight  Height: 5' 5" (165.1 cm)  Weight: 73 kg (160 lb 15 oz)  BSA (Calculated - sq m): 1.83 sq meters  BMI (Calculated): 26.8  Weight in (lb) to have BMI = 25: 149.9]    Physical Exam  Constitutional:       Appearance: She is well-developed.   HENT:      Head: Normocephalic and atraumatic.   Cardiovascular:      Rate and Rhythm: Normal rate and regular rhythm.      Heart sounds: Normal heart sounds. No murmur heard.  Pulmonary:      Effort: Pulmonary effort is normal. No respiratory distress.      Breath sounds: Normal breath sounds.   Abdominal:      General: There is no distension.      Palpations: Abdomen is soft.      Tenderness: There is no abdominal tenderness.   Skin:     General: Skin is warm and dry.   Neurological:      Mental Status: She is alert. Mental status is at baseline.   Psychiatric:         Behavior: Behavior normal.          Laboratory:  CBC:  Recent Labs   Lab 03/14/24  0749 04/16/25  1541 04/16/25  1541 05/29/25  0705   WBC 5.6 7.71   < > 6.74   RBC 4.51 4.47   " < > 4.37   Hemoglobin 12.0  --   --   --    HGB  --  11.4 L   < > 10.9 L   Hematocrit 37.4  --   --   --    HCT  --  36.5 L   < > 35.8 L   Platelet Count  --  337   < > 323   Platelets 295  --   --   --    MCV 82.9 82   < > 82   MCH 26.6 L 25.5 L   < > 24.9 L   MCHC 32.1 31.2 L  --  30.4 L    < > = values in this interval not displayed.     CMP:  Recent Labs   Lab 06/20/24  0700 01/05/25  1443 01/17/25  1347 04/16/25  1541 06/13/25  0710   Glucose 118 H  --    < > 138 H 104   Calcium 10.1 9.7   < > 10.1 10.4   Albumin 3.5 4.3  --  3.5  --    Protein Total  --   --   --  7.5  --    Total Protein 7.1  --   --   --   --    Sodium 134 L 138   < > 137 139   Potassium 3.0 L 2.9 L   < > 4.1 3.4 L   CO2 29  --    < > 31 H 28   Carbon Dioxide  --  28  --   --   --    Chloride 96  --    < > 98 100   BUN 10 13.0   < > 12 6 L   Alkaline Phosphatase 110  --   --   --   --    ALP  --   --   --  118  --    ALT 6 L 5  --  7 L  --    AST 21 21  --  23  --    Total Bilirubin 0.6 0.4  --   --   --    Bilirubin Total  --   --   --  0.4  --     < > = values in this interval not displayed.     URINALYSIS:       LIPIDS:  Recent Labs   Lab 11/29/22  0645 05/15/23  0656 03/14/24  0749 06/20/24  0700 09/20/24  0000 04/16/25  1541   TSH 0.43  --  0.26 L  --   --  1.274   HDL 78 87 84 72 51  --    Cholesterol 257 H 251 H 261 H 254 H 230 A  --    Triglycerides 89 74 126 101 129  --    LDL Cholesterol 160 H 146 H 152 H 161.8 H  --   --    LDL Calculated  --   --   --   --  154  --    HDL/Cholesterol Ratio 3.3 2.9 3.1 28.3  --   --    Non-HDL Cholesterol  --   --   --  182  --   --    Non HDL Chol. (LDL+VLDL) 179 H 164 H 177 H  --   --   --    Total Cholesterol/HDL Ratio  --   --   --  3.5  --   --      TSH:  Recent Labs   Lab 11/29/22  0645 03/14/24  0749 04/16/25  1541   TSH 0.43 0.26 L 1.274     A1C:  Recent Labs   Lab 11/29/22  0645 05/15/23  0656 03/14/24  0749 06/20/24  0700 04/16/25  1541   Hemoglobin A1C 6.9 H 6.5 H 7.3 H 6.7 H  --     Hemoglobin A1c  --   --   --   --  6.7 H       Assessment/Plan     Geraldine Longoria is a 74 y.o.female with:    1. Hypokalemia  - Basic Metabolic Panel; Future    2. Solitary pulmonary nodule  - CT Chest Without Contrast; Future    3. Anemia, unspecified type  - Ambulatory referral/consult to Gastroenterology; Future    4. Iron deficiency  - Iron and TIBC; Future  - Ferritin; Future   Recommend increasing her oral iron supplementation to help with levels.   Will repeat in 4-6 weeks    Chronic conditions status updated as per HPI.  Other than changes above, cont current medications and maintain follow up with specialists.  No follow-ups on file.    Future Appointments   Date Time Provider Department Center   6/23/2025  1:15 PM [facility] [place]   7/1/2025  1:30 PM Sadie Grossman PA-C [facility] HEM ONC [facility]   8/5/2025  2:30 PM Brianna Cordon MD [facility] ALLERGY [place]   8/14/2025 12:40 PM Rhiannon Castellanos NP [facility] GASTRO [place]   9/15/2025  2:00 PM Ricardo Holloway MD [facility] RHEUM [facility]   12/1/2025  2:00 PM Brett Garrison MD [facility] PRICRE [place]       Adrienne Cotaya, FNP [facility]                       [1]   Social History  Tobacco Use    Smoking status: Never    Smokeless tobacco: Never   Substance Use Topics    Alcohol use: Never    Drug use: Never   [2]   Outpatient Encounter Medications as of 6/12/2025   Medication Sig Dispense Refill    amLODIPine (NORVASC) 5 MG tablet Take 1 tablet (5 mg total) by mouth once daily. 90 tablet 3    chlorthalidone (HYGROTEN) 25 MG Tab Take 1 tablet (25 mg total) by mouth 2 (two) times a day. 180 tablet 3    fluocinolone acetonide oiL 0.01 % Drop Place 5 drops in ear(s) daily as needed (ear eczema). 20 mL 3    furosemide (LASIX) 40 MG tablet Take 1 tablet (40 mg total) by mouth daily as needed (leg swelling). 30 tablet 3    levothyroxine (SYNTHROID) 88 MCG tablet TAKE 1 TABLET BY MOUTH ONCE  DAILY 90 tablet 3    linaCLOtide  (LINZESS) 145 mcg Cap capsule Take 1 capsule (145 mcg total) by mouth before breakfast. 90 capsule 3    potassium chloride SA (K-DUR,KLOR-CON) 20 MEQ tablet Take 1 tablet (20 mEq total) by mouth once daily. 30 tablet 3    levocetirizine (XYZAL) 5 MG tablet Take 1 tablet (5 mg total) by mouth every evening. 30 tablet 11    [DISCONTINUED] chlorthalidone (HYGROTEN) 50 MG Tab Take 1 tablet (50 mg total) by mouth once daily. (Patient not taking: Reported on 5/27/2025) 90 tablet 1     No facility-administered encounter medications on file as of 6/12/2025.

## 2025-06-13 ENCOUNTER — RESULTS FOLLOW-UP (OUTPATIENT)
Dept: PRIMARY CARE CLINIC | Facility: CLINIC | Age: 75
End: 2025-06-13

## 2025-06-13 ENCOUNTER — HOSPITAL ENCOUNTER (OUTPATIENT)
Dept: RADIOLOGY | Facility: HOSPITAL | Age: 75
Discharge: HOME | End: 2025-06-13
Attending: NURSE PRACTITIONER
Payer: MEDICARE

## 2025-06-13 DIAGNOSIS — R91.1 SOLITARY PULMONARY NODULE: ICD-10-CM

## 2025-06-13 PROCEDURE — 71250 CT THORAX DX C-: CPT | Mod: TC

## 2025-06-13 PROCEDURE — 71250 CT THORAX DX C-: CPT | Mod: 26,,, | Performed by: STUDENT IN AN ORGANIZED HEALTH CARE EDUCATION/TRAINING PROGRAM

## 2025-06-23 ENCOUNTER — HOSPITAL ENCOUNTER (OUTPATIENT)
Dept: RADIOLOGY | Facility: HOSPITAL | Age: 75
Discharge: HOME | End: 2025-06-23
Attending: INTERNAL MEDICINE
Payer: MEDICARE

## 2025-06-23 VITALS — HEIGHT: 65 IN | WEIGHT: 160 LBS | BODY MASS INDEX: 26.66 KG/M2

## 2025-06-23 DIAGNOSIS — Z12.31 ENCOUNTER FOR SCREENING MAMMOGRAM FOR BREAST CANCER: ICD-10-CM

## 2025-06-23 PROCEDURE — 77067 SCR MAMMO BI INCL CAD: CPT | Mod: TC

## 2025-06-23 PROCEDURE — 77063 BREAST TOMOSYNTHESIS BI: CPT | Mod: 26,,, | Performed by: RADIOLOGY

## 2025-06-23 PROCEDURE — 77067 SCR MAMMO BI INCL CAD: CPT | Mod: 26,,, | Performed by: RADIOLOGY

## 2025-06-27 ENCOUNTER — RESULTS FOLLOW-UP (OUTPATIENT)
Dept: PRIMARY CARE CLINIC | Facility: CLINIC | Age: 75
End: 2025-06-27

## 2025-06-30 ENCOUNTER — PATIENT MESSAGE (OUTPATIENT)
Dept: ADMINISTRATIVE | Facility: HOSPITAL | Age: 75
End: 2025-06-30
Payer: MEDICARE

## 2025-07-01 ENCOUNTER — OFFICE VISIT (OUTPATIENT)
Dept: HEMATOLOGY/ONCOLOGY | Facility: CLINIC | Age: 75
End: 2025-07-01
Payer: MEDICARE

## 2025-07-01 VITALS
HEIGHT: 62 IN | WEIGHT: 159.19 LBS | SYSTOLIC BLOOD PRESSURE: 127 MMHG | RESPIRATION RATE: 20 BRPM | OXYGEN SATURATION: 97 % | DIASTOLIC BLOOD PRESSURE: 70 MMHG | TEMPERATURE: 98 F | BODY MASS INDEX: 29.3 KG/M2 | HEART RATE: 85 BPM

## 2025-07-01 DIAGNOSIS — D64.9 ANEMIA, UNSPECIFIED TYPE: ICD-10-CM

## 2025-07-01 DIAGNOSIS — D50.9 IRON DEFICIENCY ANEMIA, UNSPECIFIED IRON DEFICIENCY ANEMIA TYPE: Primary | ICD-10-CM

## 2025-07-01 PROCEDURE — 3008F BODY MASS INDEX DOCD: CPT | Mod: CPTII,S$GLB,, | Performed by: PHYSICIAN ASSISTANT

## 2025-07-01 PROCEDURE — 99999 PR PBB SHADOW E&M-EST. PATIENT-LVL IV: CPT | Mod: PBBFAC,,, | Performed by: PHYSICIAN ASSISTANT

## 2025-07-01 PROCEDURE — 1159F MED LIST DOCD IN RCRD: CPT | Mod: CPTII,S$GLB,, | Performed by: PHYSICIAN ASSISTANT

## 2025-07-01 PROCEDURE — 3288F FALL RISK ASSESSMENT DOCD: CPT | Mod: CPTII,S$GLB,, | Performed by: PHYSICIAN ASSISTANT

## 2025-07-01 PROCEDURE — 3044F HG A1C LEVEL LT 7.0%: CPT | Mod: CPTII,S$GLB,, | Performed by: PHYSICIAN ASSISTANT

## 2025-07-01 PROCEDURE — 3074F SYST BP LT 130 MM HG: CPT | Mod: CPTII,S$GLB,, | Performed by: PHYSICIAN ASSISTANT

## 2025-07-01 PROCEDURE — 3061F NEG MICROALBUMINURIA REV: CPT | Mod: CPTII,S$GLB,, | Performed by: PHYSICIAN ASSISTANT

## 2025-07-01 PROCEDURE — 3078F DIAST BP <80 MM HG: CPT | Mod: CPTII,S$GLB,, | Performed by: PHYSICIAN ASSISTANT

## 2025-07-01 PROCEDURE — 3066F NEPHROPATHY DOC TX: CPT | Mod: CPTII,S$GLB,, | Performed by: PHYSICIAN ASSISTANT

## 2025-07-01 PROCEDURE — 99203 OFFICE O/P NEW LOW 30 MIN: CPT | Mod: S$GLB,,, | Performed by: PHYSICIAN ASSISTANT

## 2025-07-01 PROCEDURE — 1126F AMNT PAIN NOTED NONE PRSNT: CPT | Mod: CPTII,S$GLB,, | Performed by: PHYSICIAN ASSISTANT

## 2025-07-01 PROCEDURE — 1101F PT FALLS ASSESS-DOCD LE1/YR: CPT | Mod: CPTII,S$GLB,, | Performed by: PHYSICIAN ASSISTANT

## 2025-07-01 RX ORDER — EPINEPHRINE 0.3 MG/.3ML
0.3 INJECTION SUBCUTANEOUS ONCE AS NEEDED
Status: CANCELLED | OUTPATIENT
Start: 2025-07-01

## 2025-07-01 RX ORDER — SODIUM CHLORIDE 0.9 % (FLUSH) 0.9 %
10 SYRINGE (ML) INJECTION
Status: CANCELLED | OUTPATIENT
Start: 2025-07-01

## 2025-07-01 RX ORDER — HEPARIN 100 UNIT/ML
500 SYRINGE INTRAVENOUS
Status: CANCELLED | OUTPATIENT
Start: 2025-07-01

## 2025-07-01 NOTE — PROGRESS NOTES
Hematology/Oncology Clinic New Patient Note    Patient ID: Geraldine Longoria is a 74 y.o. female.    Chief Complaint: Iron Deficiency Anemia     History     Ms. Pagan is a 74 y.o. female referred to hematology for the evaluation and management of iron deficiency anemia. Co morbidities include DVT, HTN, HLD, T2DM, and hypothyroidism.     She reports iron deficiency for the past several months. Is not taking iron supplementation, but has increased her dietary iron intake with things like beet juice. Previously was iron deficient after childbirth, but not since then  Some dietary changes - less calories, more fruits and vegetables, eating somewhat less also with decreased appetite. No tobacco or alcohol use.     Last colonoscopy was in 2022. Denies chest pain, SOB, dizziness, or headaches. Has some fatigue and overall just feels like she does not have energy lately.     Review of patient's allergies indicates:   Allergen Reactions    House dust mite Shortness Of Breath    Soy protein Other (See Comments)         Past medical, surgical, and medication history, past family history reviewed and updated with patient today as noted.      Past Medical History:   Diagnosis Date    Thyroid disease        Past Surgical History:   Procedure Laterality Date    BREAST BIOPSY      BREAST LUMPECTOMY Right     right breast cancer    BREAST SURGERY  03/1994    CATARACT EXTRACTION Left 04/2023    3/2024    KIDNEY SURGERY         Review of Systems:  Review of Systems   Constitutional:  Positive for malaise/fatigue.   HENT:  Negative for nosebleeds.    Respiratory:  Negative for shortness of breath.    Cardiovascular:  Negative for chest pain.   Gastrointestinal:  Negative for abdominal pain, blood in stool, diarrhea and vomiting.   Genitourinary:  Negative for hematuria.   Neurological:  Negative for dizziness and headaches.          Physical Exam   Vitals:  /70 (BP Location: Left arm, Patient Position: Sitting)   Pulse 85   Temp 98.2  "°F (36.8 °C) (Oral)   Resp 20   Ht 5' 2" (1.575 m)   Wt 72.2 kg (159 lb 2.8 oz)   SpO2 97%   BMI 29.11 kg/m²     Labs:  No visits with results within 2 Day(s) from this visit.   Latest known visit with results is:   Lab Visit on 06/13/2025   Component Date Value Ref Range Status    Sodium 06/13/2025 139  136 - 145 mmol/L Final    Potassium 06/13/2025 3.4 (L)  3.5 - 5.1 mmol/L Final    Chloride 06/13/2025 100  95 - 110 mmol/L Final    CO2 06/13/2025 28  23 - 29 mmol/L Final    Glucose 06/13/2025 104  70 - 110 mg/dL Final    BUN 06/13/2025 6 (L)  8 - 23 mg/dL Final    Creatinine 06/13/2025 0.9  0.5 - 1.4 mg/dL Final    Calcium 06/13/2025 10.4  8.7 - 10.5 mg/dL Final    Anion Gap 06/13/2025 11  8 - 16 mmol/L Final    eGFR 06/13/2025 >60  >60 mL/min/1.73/m2 Final    Estimated GFR calculated using the CKD-EPI creatinine (2021) equation.        Physical Exam:  Physical Exam     ECOG:   ECOG SCORE    1 - Restricted in strenuous activity-ambulatory and able to carry out work of a light nature          PROCEDURES/IMAGING  Mammo Digital Screening Bilat w/ Tirso  Narrative: Facility:  [facility] [address] [place] [address]    Name: Geraldine Longoria    MRN: [id number]    Result:  Mammo Digital Screening Bilat w/ Tirso    History:  Patient is 74 y.o. and is seen for a screening mammogram.    Films Compared:  Prior images (if available) were compared.     Findings:  This procedure was performed using tomosynthesis.   Computer-aided detection was utilized in the interpretation of this   examination.    The breasts are heterogeneously dense, which may obscure small masses.   There is no evidence of suspicious masses, microcalcifications or   architectural distortion.  Impression:    No mammographic evidence of malignancy.    BI-RADS Category 1: Negative    Recommendation:  Routine screening mammogram in 1 year is recommended.    Your estimated lifetime risk of breast cancer (to age 85) based " on   Tyrer-Cuzick risk assessment model is 4.59%.  According to the American   Cancer Society, patients with a lifetime breast cancer risk of 20% or   higher might benefit from supplemental screening tests, such as screening   breast MRI.    Electronically signed by,  Orlando Arevalo MD      Discussion     Problem List:  Problem List Items Addressed This Visit       Iron deficiency anemia - Primary    Relevant Orders    CBC Auto Differential    Ferritin    Iron and TIBC     Other Visit Diagnoses         Anemia, unspecified type                 Iron Deficiency Anemia  - Unclear etiology  - Will order IV iron infusions to be done at [facility]. Injectafer x 2  - Reviewed risks and benefits  - Iron rich foods and vit C rich foods reviewed with patient  - Has GI appt scheduled 8/14 for evaluation.     RTC in 3 months       Sadie Grossman PA-C  7/1/2025 10:02 AM   Hematology/Oncology  [facility] - [address], [address]  [place] [address]  Phone: [phone]  Fax: [phone]

## 2025-07-01 NOTE — PROGRESS NOTES
Iron deficiency after childbirth, but not since then  Some dietary changes - less calories, more fruits and vegetables, eating somewhat less also with decreased appetite

## 2025-07-02 RX ORDER — SODIUM CHLORIDE 0.9 % (FLUSH) 0.9 %
10 SYRINGE (ML) INJECTION
OUTPATIENT
Start: 2025-07-02

## 2025-07-02 RX ORDER — HEPARIN 100 UNIT/ML
500 SYRINGE INTRAVENOUS
OUTPATIENT
Start: 2025-07-02

## 2025-07-02 RX ORDER — EPINEPHRINE 0.3 MG/.3ML
0.3 INJECTION SUBCUTANEOUS ONCE AS NEEDED
OUTPATIENT
Start: 2025-07-02

## 2025-07-07 ENCOUNTER — LAB VISIT (OUTPATIENT)
Dept: LAB | Facility: HOSPITAL | Age: 75
End: 2025-07-07
Attending: NURSE PRACTITIONER
Payer: MEDICARE

## 2025-07-07 DIAGNOSIS — D50.9 IRON DEFICIENCY ANEMIA, UNSPECIFIED IRON DEFICIENCY ANEMIA TYPE: ICD-10-CM

## 2025-07-07 LAB
ABSOLUTE EOSINOPHIL (OHS): 0.24 K/UL
ABSOLUTE MONOCYTE (OHS): 0.72 K/UL (ref 0.3–1)
ABSOLUTE NEUTROPHIL COUNT (OHS): 3.55 K/UL (ref 1.8–7.7)
BASOPHILS # BLD AUTO: 0.06 K/UL
BASOPHILS NFR BLD AUTO: 0.9 %
ERYTHROCYTE [DISTWIDTH] IN BLOOD BY AUTOMATED COUNT: 13.2 % (ref 11.5–14.5)
FERRITIN SERPL-MCNC: 11 NG/ML (ref 20–300)
HCT VFR BLD AUTO: 33.6 % (ref 37–48.5)
HGB BLD-MCNC: 10.4 GM/DL (ref 12–16)
IMM GRANULOCYTES # BLD AUTO: 0.01 K/UL (ref 0–0.04)
IMM GRANULOCYTES NFR BLD AUTO: 0.1 % (ref 0–0.5)
IRON SATN MFR SERPL: 6 % (ref 20–50)
IRON SERPL-MCNC: 27 UG/DL (ref 30–160)
LYMPHOCYTES # BLD AUTO: 2.31 K/UL (ref 1–4.8)
MCH RBC QN AUTO: 25.6 PG (ref 27–31)
MCHC RBC AUTO-ENTMCNC: 31 G/DL (ref 32–36)
MCV RBC AUTO: 83 FL (ref 82–98)
NUCLEATED RBC (/100WBC) (OHS): 0 /100 WBC
PLATELET # BLD AUTO: 293 K/UL (ref 150–450)
PMV BLD AUTO: 11.3 FL (ref 9.2–12.9)
RBC # BLD AUTO: 4.06 M/UL (ref 4–5.4)
RELATIVE EOSINOPHIL (OHS): 3.5 %
RELATIVE LYMPHOCYTE (OHS): 33.5 % (ref 18–48)
RELATIVE MONOCYTE (OHS): 10.4 % (ref 4–15)
RELATIVE NEUTROPHIL (OHS): 51.6 % (ref 38–73)
TIBC SERPL-MCNC: 488 UG/DL (ref 250–450)
TRANSFERRIN SERPL-MCNC: 330 MG/DL (ref 200–375)
WBC # BLD AUTO: 6.89 K/UL (ref 3.9–12.7)

## 2025-07-07 PROCEDURE — 84466 ASSAY OF TRANSFERRIN: CPT

## 2025-07-07 PROCEDURE — 36415 COLL VENOUS BLD VENIPUNCTURE: CPT

## 2025-07-07 PROCEDURE — 82728 ASSAY OF FERRITIN: CPT

## 2025-07-07 PROCEDURE — 85025 COMPLETE CBC W/AUTO DIFF WBC: CPT

## 2025-07-15 ENCOUNTER — OFFICE VISIT (OUTPATIENT)
Dept: GASTROENTEROLOGY | Facility: CLINIC | Age: 75
End: 2025-07-15
Payer: MEDICARE

## 2025-07-15 ENCOUNTER — TELEPHONE (OUTPATIENT)
Dept: INFUSION THERAPY | Facility: HOSPITAL | Age: 75
End: 2025-07-15
Payer: MEDICARE

## 2025-07-15 VITALS
BODY MASS INDEX: 29.21 KG/M2 | HEIGHT: 62 IN | DIASTOLIC BLOOD PRESSURE: 75 MMHG | WEIGHT: 158.75 LBS | HEART RATE: 83 BPM | SYSTOLIC BLOOD PRESSURE: 145 MMHG

## 2025-07-15 DIAGNOSIS — D50.9 IRON DEFICIENCY ANEMIA, UNSPECIFIED IRON DEFICIENCY ANEMIA TYPE: Primary | ICD-10-CM

## 2025-07-15 DIAGNOSIS — K21.9 GASTROESOPHAGEAL REFLUX DISEASE, UNSPECIFIED WHETHER ESOPHAGITIS PRESENT: ICD-10-CM

## 2025-07-15 DIAGNOSIS — R63.0 POOR APPETITE: ICD-10-CM

## 2025-07-15 DIAGNOSIS — R14.0 ABDOMINAL BLOATING: ICD-10-CM

## 2025-07-15 DIAGNOSIS — K59.04 CHRONIC IDIOPATHIC CONSTIPATION: ICD-10-CM

## 2025-07-15 PROCEDURE — 3066F NEPHROPATHY DOC TX: CPT | Mod: CPTII,S$GLB,,

## 2025-07-15 PROCEDURE — 3008F BODY MASS INDEX DOCD: CPT | Mod: CPTII,S$GLB,,

## 2025-07-15 PROCEDURE — 99204 OFFICE O/P NEW MOD 45 MIN: CPT | Mod: S$GLB,,,

## 2025-07-15 PROCEDURE — 3061F NEG MICROALBUMINURIA REV: CPT | Mod: CPTII,S$GLB,,

## 2025-07-15 PROCEDURE — 3288F FALL RISK ASSESSMENT DOCD: CPT | Mod: CPTII,S$GLB,,

## 2025-07-15 PROCEDURE — 1159F MED LIST DOCD IN RCRD: CPT | Mod: CPTII,S$GLB,,

## 2025-07-15 PROCEDURE — 3044F HG A1C LEVEL LT 7.0%: CPT | Mod: CPTII,S$GLB,,

## 2025-07-15 PROCEDURE — 3077F SYST BP >= 140 MM HG: CPT | Mod: CPTII,S$GLB,,

## 2025-07-15 PROCEDURE — G2211 COMPLEX E/M VISIT ADD ON: HCPCS | Mod: S$GLB,,,

## 2025-07-15 PROCEDURE — 1125F AMNT PAIN NOTED PAIN PRSNT: CPT | Mod: CPTII,S$GLB,,

## 2025-07-15 PROCEDURE — 99999 PR PBB SHADOW E&M-EST. PATIENT-LVL III: CPT | Mod: PBBFAC,,,

## 2025-07-15 PROCEDURE — 1101F PT FALLS ASSESS-DOCD LE1/YR: CPT | Mod: CPTII,S$GLB,,

## 2025-07-15 PROCEDURE — 3078F DIAST BP <80 MM HG: CPT | Mod: CPTII,S$GLB,,

## 2025-07-15 NOTE — PATIENT INSTRUCTIONS
For GERD/Reflux:     Take your PPI 30-45 minutes before your first protein containing meal (breakfast) every day. Take once daily for 8 weeks. If symptoms improve ok discontinue an use PPI as needed for symptoms.      Take Pepcid 20mg in the evening before bedtime to help with nocturnal symptoms, as needed.     Remain upright for at least 3 hours after eating.      Elevate the head of the bed for nighttime.      Avoid foods that you have noticed make your symptoms worse (possible triggers include: peppermint, alcohol, chocolate, caffeine, spicy foods, greasy/fried foods, acidic foods-citrus).     GERD Treatment: Lifestyle and Dietary Changes    Dietary and lifestyle changes are the first step in treating GERD. Certain foods make the reflux worse. Suggestions to help alleviate symptoms include:  Lose weight if you are overweight -- of all of the lifestyle changes you can make, this one is the most effective.  Avoid foods that increase the level of acid in your stomach, including caffeinated beverages.  Avoid foods that decrease the pressure in the lower esophagus, such as fatty foods, alcohol and peppermint.  Avoid foods that affect peristalsis (the muscle movements in your digestive tract), such as coffee, alcohol and acidic liquids.  Avoid foods that slow gastric emptying, including fatty foods.  Avoid large meals.  Quit smoking.  Do not lie down immediately after a meal.  Elevate the level of your head when you lie down.    Foods That May Cause Heartburn  Foods commonly known to be heartburn triggers cause the esophageal sphincter to relax and delay the digestive process, letting food sit in the stomach longer, says Teddy. The worst culprits? Foods that are high in fat, salt or spice such as:  Fried food  Fast food  Pizza  Potato chips and other processed snacks  Chili powder and pepper (white, black, cayenne)  Fatty meats such as garcia and sausage  Cheese  Other foods that can cause the same problem  include:  Tomato-based sauces  Citrus fruits  Chocolate  Peppermint  Carbonated beverages    Moderation is key since many people may not be able to or want to completely eliminate these foods. But try to avoid eating problem foods late in the evening closer to bedtime, so they're not sitting in your stomach and then coming up your esophagus when you lay down at night. It's also a good idea to eat small frequent meals instead of bigger, heavier meals and avoid late-night dinners and bedtime snacks.    Foods That Help Prevent Acid Reflux  Good news: There are plenty of things you can eat to help prevent acid reflux. Stock your kitchen with foods from these three categories:    High-fiber foods  Fibrous foods make you feel full so you're less likely to overeat, which may contribute to heartburn. So, load up on healthy fiber from these foods:  Whole grains such as oatmeal, couscous and brown rice.  Root vegetables such as sweet potatoes, carrots and beets.  Green vegetables such as asparagus, broccoli and green beans.    Alkaline foods  Foods fall somewhere along the pH scale (an indicator of acid levels). Those that have a low pH are acidic and more likely to cause reflux. Those with higher pH are alkaline and can help offset strong stomach acid. Alkaline foods include:  Bananas  Melons  Cauliflower  Fennel  Nuts    Watery foods  Eating foods that contain a lot of water can dilute and weaken stomach acid. Choose foods such as:  Celery  Cucumber  Lettuce  Watermelon  Broth-based soups  Herbal tea

## 2025-07-15 NOTE — TELEPHONE ENCOUNTER
Confirmed upcoming infusion appointments with the patient. Reviewed pre infusion instructions with the patient.   7/31 at 230p and every Thur thereafter x 4 at 230p   English

## 2025-07-15 NOTE — PROGRESS NOTES
Patient ID: Geraldine Longoria is a 74 y.o. female.    PCP:   Brett Garrison   [address] [facility] / [place] [address]    Gastroenterology Clinic Consultation Note    Reason for Visit:  The primary encounter diagnosis was Iron deficiency anemia, unspecified iron deficiency anemia type. Diagnoses of Gastroesophageal reflux disease, unspecified whether esophagitis present, Chronic idiopathic constipation, Abdominal bloating, and Poor appetite were also pertinent to this visit.  Chief Complaint: Iron Deficiency Anemia     History of Present Illness    CHIEF COMPLAINT:  Patient presents today for anemia    HEMATOLOGIC:  She reports ongoing decline in blood counts over the past four months. Most recent hemoglobin collected 8 days ago were 10.4 and hematocrit 33.6, decreasing from previously stable levels about one year prior. Iron levels have fluctuated with most recent count of 27. She is scheduled for first iron transfusion on the 31st. No obvious underlying cause for blood count decline has been identified. Followed by hematology/oncology.     GI CONCERNS:  She reports chronic constipation since childhood, currently managed with herbal pills at night. She experiences acid reflux managed with Omeprazole 20mg daily. She also experiences significant abdominal bloating. She denies nausea, epigastric pain, changes in bowel patterns, and blood in stool. She reports difficulty with digestion, stating her digestive system is significantly impaired. Upper endoscopy was performed approximately 20 years ago. Colonoscopy in 2022 was satisfactory.    DIET AND NUTRITION:  She follows a vegetarian diet consisting of approximately 50% fruit, lentils, and small amounts of grains like rice. She reports poor appetite with difficulty eating full meals and significant post-prandial bloating. She notes fluctuating weight and strength.    PHYSICAL SYMPTOMS:  She reports significant lower extremity edema with persistent ring-like marks that  resolve only after 2-3 hours of foot elevation. She experiences burning sensation in feet and difficulty bearing weight on foot bottoms. Her mobility has significantly declined from 10,000 steps daily to 1,500-2,000 steps per day over the last two years.    CHEST PAIN:  She reports persistent left breast pain previously assessed by Dr. Ross. She describes severe pain with associated swelling on the left side under the breast. Previous chest imaging was performed but results were not communicated to her.    LABS:  Potassium and calcium levels are low, though calcium is not considered severe. Magnesium was low 4-5 months ago.    MEDICATIONS:  She denies regular use of NSAIDs. She reports minimal interaction with medical treatments, stating she historically had only two medical visits per year until the current year.    SOCIAL HISTORY:  She denies current or past tobacco or alcohol use.    SURGICAL HISTORY:  She reports history of an invasive kidney procedure. She denies other abdominal surgeries.    Denies any dysphagia, odynophagia, nausea, vomiting. No abdominal pains, changes in bowel pattern, blood/ mucus in stool or unintentional weight loss. Nocturnal symptoms. No melena or maroon stools. No recent changes in diet or medications. No family history of IBD, Celiac disease or GI malignancy. No regular NSAIDs (occasional use). No alcohol (none) or tobacco (none) use. No recent antibiotic use, travels or sick contacts. No prior history of C.diff.    Abdominal Surgeries: None    ROS:  General: -fever, -chills, -fatigue, -weight gain, -weight loss, +loss of appetite  Eyes: -vision changes, -redness, -discharge  ENT: -ear pain, -nasal congestion, -sore throat  Cardiovascular: -chest pain, -palpitations, -lower extremity edema  Respiratory: -cough, -shortness of breath  Gastrointestinal: -abdominal pain, -nausea, -vomiting, -diarrhea, +constipation, -blood in stool, +bloating, +indigestion  Genitourinary: -dysuria,  "-hematuria, -frequency  Musculoskeletal: -joint pain, -muscle pain, +pain with movement, +lower extremity pain with movement, +muscle weakness  Skin: -rash, -lesion  Neurological: -headache, -dizziness, -numbness, -tingling, +burning sensation  Psychiatric: -anxiety, -depression, -sleep difficulty  Breasts: +breast pain, +swelling    Medical History:  has a past medical history of Thyroid disease.    Surgical History:  has a past surgical history that includes Kidney surgery; Breast surgery (03/1994); Cataract extraction (Left, 04/2023); Breast biopsy; and Breast lumpectomy (Right).    Family History: family history includes Arthritis in her mother; Breast cancer in her paternal aunt and paternal cousin; Cancer in her paternal aunt; Diabetes in her sister; Hypertension in her sister..     Vital Signs:  BP (!) 145/75 (Patient Position: Sitting)   Pulse 83   Ht 5' 2" (1.575 m)   Wt 72 kg (158 lb 11.7 oz)   BMI 29.03 kg/m²   Body mass index is 29.03 kg/m².    Physical Exam    General: No acute distress. Well-developed. Well-nourished.  Eyes: EOMI. Sclerae anicteric.  HENT: Normocephalic. Atraumatic. Nares patent. Moist oral mucosa.  Ears: Bilateral TMs clear. Bilateral EACs clear.  Cardiovascular: Regular rate. Regular rhythm. No murmurs. No rubs. No gallops. Normal S1, S2.  Respiratory: Normal respiratory effort. Clear to auscultation bilaterally. No rales. No rhonchi. No wheezing.  Abdomen: Soft. Non-tender. Non-distended. Normoactive bowel sounds.  Musculoskeletal: No  obvious deformity.  Extremities: No lower extremity edema.  Neurological: Alert & oriented x3. No slurred speech. Normal gait.  Psychiatric: Normal mood. Normal affect. Good insight. Good judgment.  Skin: Warm. Dry. No rash.         Review of patient's allergies indicates:   Allergen Reactions    House dust mite Shortness Of Breath    Soy protein Other (See Comments)     Labs:  Lab Results   Component Value Date    WBC 6.89 07/07/2025    HGB 10.4 " (L) 07/07/2025    HCT 33.6 (L) 07/07/2025     07/07/2025    CRP 3.3 05/29/2025    CHOL 230 (A) 09/20/2024    TRIG 129 09/20/2024    HDL 51 09/20/2024    ALKPHOS 118 04/16/2025    ALT 7 (L) 04/16/2025    AST 23 04/16/2025     06/13/2025    K 3.4 (L) 06/13/2025     06/13/2025    CREATININE 0.9 06/13/2025    BUN 6 (L) 06/13/2025    CO2 28 06/13/2025    TSH 1.274 04/16/2025    HGBA1C 6.7 (H) 04/16/2025    MICROALBUR <0.2 03/14/2024     Imaging reviewed:   No recent/relevant GI imaging performed for review.     Endoscopy reviewed:   Colonoscopy 9/14/2022  - Normal mucosa in the rectum  - Internal hemorrhoids  - Moderate diverticulosis of the sigmoid colon  - Mild diverticulosis of the descending colon  - Normal mucosa in the ileocecal valve  - Normal mucosa in the cecum and appendiceal orifice  - Normal mucosa in the whole colon  - Colonoscopy in 10 years     Assessment & Plan    D50.9 Iron deficiency anemia, unspecified  E87.6 Hypokalemia  E83.42 Hypomagnesemia  K21.9 Gastroesophageal reflux disease, unspecified whether esophagitis present   K59.04 Chronic idiopathic constipation  R14.0 Abdominal distension (gaseous)  R60.0 Localized edema    IRON DEFICIENCY ANEMIA:  - Reviewed recent labs showing declining hemoglobin and hematocrit levels and iron studies over past 3-4 months, with (mildly) low potassium and magnesium levels.  - Potential GI sources for iron deficiency anemia, given no obvious cause identified.  - Ordered urgent upper endoscopy and colonoscopy for FABIAN to investigate possible bleeding ulcer, gastritis, or other GI tract issues.    GASTROINTESTINAL ISSUES:  - Chronic constipation, bloating, acid reflux, and epigastric discomfort.  - Long-standing use of (low dose) Omeprazole for gastric issues in the setting of (mildly) low K, Mg.     DIETARY CONSIDERATIONS:  - Diet predominantly vegetarian with high fruit intake.    FOLLOW-UP PLAN:  - Follow up with GI in 3 months.     Assessment:  1.  Iron deficiency anemia, unspecified iron deficiency anemia type    2. Gastroesophageal reflux disease, unspecified whether esophagitis present    3. Chronic idiopathic constipation    4. Abdominal bloating    5. Poor appetite      Follow up in about 3 months (around 10/15/2025).    Thank you for allowing me to participate in this patient's care.     Sincerely,      LAVON DURÁN  Gastroenterology Department  [facility]     This note was generated with the assistance of ambient listening technology. Verbal consent was obtained by the patient and accompanying visitor(s) for the recording of patient appointment to facilitate this note. I attest to having reviewed and edited the generated note for accuracy, though some syntax or spelling errors may persist. Please contact the author of this note for any clarification.

## 2025-07-16 ENCOUNTER — TELEPHONE (OUTPATIENT)
Dept: ENDOSCOPY | Facility: HOSPITAL | Age: 75
End: 2025-07-16
Payer: MEDICARE

## 2025-07-16 DIAGNOSIS — D50.9 IRON DEFICIENCY ANEMIA, UNSPECIFIED IRON DEFICIENCY ANEMIA TYPE: ICD-10-CM

## 2025-07-16 DIAGNOSIS — R10.9 ABDOMINAL PAIN, UNSPECIFIED ABDOMINAL LOCATION: Primary | ICD-10-CM

## 2025-07-16 NOTE — TELEPHONE ENCOUNTER
"    Message  Received: Yesterday  Rhiannon Castellanos NP Piglia, Ashley, RN  Caller: Unspecified (Yesterday,  3:52 PM)  Procedure: EGD/Colonoscopy    Diagnosis: Abdominal pain and Iron deficiency anemia    Procedure Timing: Within 4 weeks (Urgent)    *If within 4 weeks selected, please alonzo as high priority*    *If greater than 12 weeks, please select "5-12 weeks" and delay sending until 3 months prior to requested date*    Location: Any Site    Additional Scheduling Information: Advanced cardiac and Diabetes    Prep Specifications:Standard prep    Is the patient taking a GLP-1 Agonist:no    Have you attached a patient to this message: yes  "

## 2025-07-16 NOTE — TELEPHONE ENCOUNTER
Patient is scheduled for a Colonoscopy/EGD on 8/14/25 with Dr. ANTONY Marin  Referral for procedure from Clinic visit

## 2025-07-17 ENCOUNTER — TELEPHONE (OUTPATIENT)
Dept: INFUSION THERAPY | Facility: HOSPITAL | Age: 75
End: 2025-07-17
Payer: MEDICARE

## 2025-07-17 NOTE — TELEPHONE ENCOUNTER
Returned pt's call. Spoke with pt and pt would like to change iv iron appointment time to an earlier time that day or earlier date if available. Pt IV iron appointment rescheduled to 8:30am on 7/31/25.

## 2025-07-21 ENCOUNTER — ANESTHESIA EVENT (OUTPATIENT)
Dept: ENDOSCOPY | Facility: HOSPITAL | Age: 75
End: 2025-07-21
Payer: MEDICARE

## 2025-07-27 DIAGNOSIS — E87.6 HYPOKALEMIA: ICD-10-CM

## 2025-07-28 RX ORDER — POTASSIUM CHLORIDE 20 MEQ/1
20 TABLET, EXTENDED RELEASE ORAL
Qty: 30 TABLET | Refills: 11 | Status: SHIPPED | OUTPATIENT
Start: 2025-07-28

## 2025-07-29 ENCOUNTER — TELEPHONE (OUTPATIENT)
Dept: INFUSION THERAPY | Facility: HOSPITAL | Age: 75
End: 2025-07-29
Payer: MEDICARE

## 2025-07-29 NOTE — TELEPHONE ENCOUNTER
Pt called to reschedule infusion appt scheduled on 8/14 due to having procedure same day. Will add to end of series.

## 2025-07-31 ENCOUNTER — INFUSION (OUTPATIENT)
Dept: INFUSION THERAPY | Facility: HOSPITAL | Age: 75
End: 2025-07-31
Attending: PHYSICIAN ASSISTANT
Payer: MEDICARE

## 2025-07-31 VITALS
SYSTOLIC BLOOD PRESSURE: 148 MMHG | HEART RATE: 73 BPM | OXYGEN SATURATION: 99 % | TEMPERATURE: 98 F | DIASTOLIC BLOOD PRESSURE: 70 MMHG

## 2025-07-31 DIAGNOSIS — D50.9 IRON DEFICIENCY ANEMIA, UNSPECIFIED IRON DEFICIENCY ANEMIA TYPE: Primary | ICD-10-CM

## 2025-07-31 PROCEDURE — 63600175 PHARM REV CODE 636 W HCPCS: Performed by: PHYSICIAN ASSISTANT

## 2025-07-31 PROCEDURE — 25000003 PHARM REV CODE 250: Performed by: PHYSICIAN ASSISTANT

## 2025-07-31 PROCEDURE — 96374 THER/PROPH/DIAG INJ IV PUSH: CPT

## 2025-07-31 PROCEDURE — A4216 STERILE WATER/SALINE, 10 ML: HCPCS | Performed by: PHYSICIAN ASSISTANT

## 2025-07-31 RX ORDER — HEPARIN 100 UNIT/ML
500 SYRINGE INTRAVENOUS
OUTPATIENT
Start: 2025-08-07

## 2025-07-31 RX ORDER — HEPARIN 100 UNIT/ML
500 SYRINGE INTRAVENOUS
Status: DISCONTINUED | OUTPATIENT
Start: 2025-07-31 | End: 2025-07-31 | Stop reason: HOSPADM

## 2025-07-31 RX ORDER — EPINEPHRINE 0.3 MG/.3ML
0.3 INJECTION SUBCUTANEOUS ONCE AS NEEDED
OUTPATIENT
Start: 2025-08-07

## 2025-07-31 RX ORDER — SODIUM CHLORIDE 0.9 % (FLUSH) 0.9 %
10 SYRINGE (ML) INJECTION
OUTPATIENT
Start: 2025-08-07

## 2025-07-31 RX ORDER — SODIUM CHLORIDE 0.9 % (FLUSH) 0.9 %
10 SYRINGE (ML) INJECTION
Status: DISCONTINUED | OUTPATIENT
Start: 2025-07-31 | End: 2025-07-31 | Stop reason: HOSPADM

## 2025-07-31 RX ADMIN — IRON SUCROSE 200 MG: 20 INJECTION, SOLUTION INTRAVENOUS at 08:07

## 2025-07-31 RX ADMIN — Medication 10 ML: at 08:07

## 2025-07-31 NOTE — NURSING
Venofer 1/5 given patient tolerated well, observed for 30 minutes following infusion. vss, PIV removed. Follow up appointments given on printed calendar and AVS.

## 2025-08-05 ENCOUNTER — LAB VISIT (OUTPATIENT)
Dept: LAB | Facility: HOSPITAL | Age: 75
End: 2025-08-05
Payer: MEDICARE

## 2025-08-05 ENCOUNTER — OFFICE VISIT (OUTPATIENT)
Dept: ALLERGY | Facility: CLINIC | Age: 75
End: 2025-08-05
Payer: MEDICARE

## 2025-08-05 VITALS — HEIGHT: 62 IN | BODY MASS INDEX: 29.21 KG/M2 | WEIGHT: 158.75 LBS

## 2025-08-05 DIAGNOSIS — L50.9 URTICARIA: ICD-10-CM

## 2025-08-05 DIAGNOSIS — L29.9 PRURITUS: ICD-10-CM

## 2025-08-05 DIAGNOSIS — L50.1 IDIOPATHIC URTICARIA: ICD-10-CM

## 2025-08-05 DIAGNOSIS — R68.2 DRY MOUTH: ICD-10-CM

## 2025-08-05 DIAGNOSIS — R68.2 DRY MOUTH: Primary | ICD-10-CM

## 2025-08-05 PROCEDURE — 99204 OFFICE O/P NEW MOD 45 MIN: CPT | Mod: S$GLB,,, | Performed by: ALLERGY & IMMUNOLOGY

## 2025-08-05 PROCEDURE — 3061F NEG MICROALBUMINURIA REV: CPT | Mod: CPTII,S$GLB,, | Performed by: ALLERGY & IMMUNOLOGY

## 2025-08-05 PROCEDURE — 1126F AMNT PAIN NOTED NONE PRSNT: CPT | Mod: CPTII,S$GLB,, | Performed by: ALLERGY & IMMUNOLOGY

## 2025-08-05 PROCEDURE — 86003 ALLG SPEC IGE CRUDE XTRC EA: CPT | Mod: 59

## 2025-08-05 PROCEDURE — 88185 FLOWCYTOMETRY/TC ADD-ON: CPT

## 2025-08-05 PROCEDURE — 1159F MED LIST DOCD IN RCRD: CPT | Mod: CPTII,S$GLB,, | Performed by: ALLERGY & IMMUNOLOGY

## 2025-08-05 PROCEDURE — 1160F RVW MEDS BY RX/DR IN RCRD: CPT | Mod: CPTII,S$GLB,, | Performed by: ALLERGY & IMMUNOLOGY

## 2025-08-05 PROCEDURE — 36415 COLL VENOUS BLD VENIPUNCTURE: CPT | Mod: PO

## 2025-08-05 PROCEDURE — 3044F HG A1C LEVEL LT 7.0%: CPT | Mod: CPTII,S$GLB,, | Performed by: ALLERGY & IMMUNOLOGY

## 2025-08-05 PROCEDURE — 86003 ALLG SPEC IGE CRUDE XTRC EA: CPT

## 2025-08-05 PROCEDURE — 99999 PR PBB SHADOW E&M-EST. PATIENT-LVL III: CPT | Mod: PBBFAC,,, | Performed by: ALLERGY & IMMUNOLOGY

## 2025-08-05 PROCEDURE — 3066F NEPHROPATHY DOC TX: CPT | Mod: CPTII,S$GLB,, | Performed by: ALLERGY & IMMUNOLOGY

## 2025-08-05 PROCEDURE — 3008F BODY MASS INDEX DOCD: CPT | Mod: CPTII,S$GLB,, | Performed by: ALLERGY & IMMUNOLOGY

## 2025-08-05 NOTE — PROGRESS NOTES
Subjective:       Patient ID: Geraldine Longoria is a 75 y.o. female.    Chief Complaint:  Allergies      74 yo woman presents for consult from Dr Brett Garrison for possible allergies. She states she has severe ear itching all the time, 24/7 her ears itch to point hurts. She has scalp itching, itchy dry eyes, and her feet burn. A couple times has broken out in hives but mostly is itching. Has had about a year. No season worse. Has all day but is worse at night. No triggers. She has watched diet and no difference. She is on xyzal but takes QOD as it dries her out, she has bad dry mouth all the time. She denies sneeze, runny nose or nasal congestion. She occ ha chest congestion but not associated with itching. She has H/O cat, dog, dust allergy as well as soy. No insect or latex allergy. No asthma or eczema but she does have dry skin. Has HTN and hypothyroid        Environmental History: see history section for home environment  Review of Systems   HENT:  Positive for ear pain. Negative for congestion, postnasal drip, rhinorrhea, sinus pressure and sneezing.    Eyes:  Positive for redness and itching. Negative for discharge.   Respiratory:  Positive for chest tightness. Negative for cough, shortness of breath and wheezing.    Skin:  Positive for color change and rash.        Objective:      Physical Exam  Vitals and nursing note reviewed.   Constitutional:       General: She is not in acute distress.     Appearance: Normal appearance. She is not ill-appearing.   HENT:      Nose: No rhinorrhea.   Eyes:      General:         Right eye: No discharge.         Left eye: No discharge.      Conjunctiva/sclera: Conjunctivae normal.   Pulmonary:      Effort: Pulmonary effort is normal. No respiratory distress.   Abdominal:      General: There is no distension.   Skin:     General: Skin is warm and dry.      Findings: No erythema or rash.   Neurological:      Mental Status: She is alert and oriented to person, place, and time.    Psychiatric:         Mood and Affect: Mood normal.         Behavior: Behavior normal.         Laboratory:   none performed   Assessment:       1. Dry mouth    2. Pruritus    3. Urticaria    4. Idiopathic urticaria         Plan:       Pruritus- advised can be allergic vs immune mediated, will send labs to assess  Continue levocetirizine 5 mg daily and can increase to BID but may cause drying as side effect  Phone review  Dr Garrison notified of completed consult via Epic    I spent a total of 45 minutes on the day of the visit.  This includes face to face time and non-face to face time preparing to see the patient (eg, review of tests), obtaining and/or reviewing separately obtained history, documenting clinical information in the electronic or other health record, independently interpreting results and communicating results to the patient/family/caregiver, or care coordinator.

## 2025-08-07 ENCOUNTER — TELEPHONE (OUTPATIENT)
Dept: ENDOSCOPY | Facility: HOSPITAL | Age: 75
End: 2025-08-07
Payer: MEDICARE

## 2025-08-07 ENCOUNTER — INFUSION (OUTPATIENT)
Dept: INFUSION THERAPY | Facility: HOSPITAL | Age: 75
End: 2025-08-07
Attending: PHYSICIAN ASSISTANT
Payer: MEDICARE

## 2025-08-07 VITALS
OXYGEN SATURATION: 97 % | RESPIRATION RATE: 18 BRPM | HEART RATE: 76 BPM | TEMPERATURE: 98 F | DIASTOLIC BLOOD PRESSURE: 60 MMHG | SYSTOLIC BLOOD PRESSURE: 125 MMHG

## 2025-08-07 DIAGNOSIS — D50.9 IRON DEFICIENCY ANEMIA, UNSPECIFIED IRON DEFICIENCY ANEMIA TYPE: Primary | ICD-10-CM

## 2025-08-07 DIAGNOSIS — D50.9 IRON DEFICIENCY ANEMIA, UNSPECIFIED IRON DEFICIENCY ANEMIA TYPE: ICD-10-CM

## 2025-08-07 DIAGNOSIS — R10.9 ABDOMINAL PAIN, UNSPECIFIED ABDOMINAL LOCATION: Primary | ICD-10-CM

## 2025-08-07 LAB
W ALMOND, CLASS: NORMAL
W ALMOND, IGE: <0.1 KU/L
W ALTERNARIA ALTERNATA, CLASS: NORMAL
W ALTERNARIA ALTERNATA, IGE: <0.1 KU/L
W ASPERGILLUS FUMIGATUS, CLASS: NORMAL
W ASPERGILLUS FUMIGATUS, IGE: <0.1 KU/L
W BAHIA GRASS, CLASS: NORMAL
W BAHIA GRASS, IGE: <0.1 KU/L
W BALD CYPRESS, CLASS: NORMAL
W BALD CYPRESS, IGE: <0.1 KU/L
W BERMUDA GRASS, CLASS: NORMAL
W BERMUDA GRASS, IGE: <0.1 KU/L
W CAT DANDER, CLASS: ABNORMAL
W CAT DANDER, IGE: 1.42 KU/L
W CHAETOMIUM GLOBOSUM, CLASS: NORMAL
W CHAETOMIUM GLOBOSUM, IGE: <0.1 KU/L
W CLADOSPORIUM HERBARUM, CLASS: NORMAL
W CLADOSPORIUM HERBARUM, IGE: <0.1 KU/L
W COCKROACH, GERMAN, CLASS: NORMAL
W COCKROACH, GERMAN, IGE: <0.1 KU/L
W SESAME SEED , CLASS: NORMAL
W SESAME SEED , IGE: <0.1 KU/L
W SHRIMP, CLASS: NORMAL
W SHRIMP, IGE: <0.1 KU/L
W SOYBEAN, CLASS: NORMAL
W SOYBEAN, IGE: <0.1 KU/L
W SUNFLOWER SEED , CLASS: NORMAL
W SUNFLOWER SEED , IGE: <0.1 KU/L
W TIMOTHY GRASS, CLASS: NORMAL
W TIMOTHY GRASS, IGE: <0.1 KU/L
W WHEAT, CLASS: NORMAL
W WHEAT, IGE: <0.1 KU/L
W WILLOW, CLASS: NORMAL
W WILLOW, IGE: <0.1 KU/L

## 2025-08-07 PROCEDURE — 25000003 PHARM REV CODE 250: Performed by: PHYSICIAN ASSISTANT

## 2025-08-07 PROCEDURE — A4216 STERILE WATER/SALINE, 10 ML: HCPCS | Performed by: PHYSICIAN ASSISTANT

## 2025-08-07 PROCEDURE — 63600175 PHARM REV CODE 636 W HCPCS: Performed by: PHYSICIAN ASSISTANT

## 2025-08-07 PROCEDURE — 96374 THER/PROPH/DIAG INJ IV PUSH: CPT

## 2025-08-07 RX ORDER — SODIUM CHLORIDE 0.9 % (FLUSH) 0.9 %
10 SYRINGE (ML) INJECTION
Status: DISCONTINUED | OUTPATIENT
Start: 2025-08-07 | End: 2025-08-07 | Stop reason: HOSPADM

## 2025-08-07 RX ORDER — SODIUM CHLORIDE 0.9 % (FLUSH) 0.9 %
10 SYRINGE (ML) INJECTION
OUTPATIENT
Start: 2025-08-14

## 2025-08-07 RX ORDER — SOD SULF/POT CHLORIDE/MAG SULF 1.479 G
12 TABLET ORAL DAILY
Qty: 24 TABLET | Refills: 0 | Status: SHIPPED | OUTPATIENT
Start: 2025-08-07

## 2025-08-07 RX ORDER — HEPARIN 100 UNIT/ML
500 SYRINGE INTRAVENOUS
OUTPATIENT
Start: 2025-08-14

## 2025-08-07 RX ORDER — EPINEPHRINE 0.3 MG/.3ML
0.3 INJECTION SUBCUTANEOUS ONCE AS NEEDED
OUTPATIENT
Start: 2025-08-14

## 2025-08-07 RX ADMIN — Medication 10 ML: at 02:08

## 2025-08-07 RX ADMIN — IRON SUCROSE 200 MG: 20 INJECTION, SOLUTION INTRAVENOUS at 02:08

## 2025-08-07 NOTE — TELEPHONE ENCOUNTER
Called patient to confirm endoscopy appointment on 8/14/25. All questions answered. Patient requesting sutab instead of PEG prep; instr via portal

## 2025-08-07 NOTE — PLAN OF CARE
Labs , hx, and medications reviewed. Assessment completed. Discussed plan of care with patient. Patient in agreement. Chair reclined and warm blanket and snack offered.

## 2025-08-07 NOTE — NURSING
Patient tolerated Venofer without complications. Discharged without any s/s of adverse event after 30 minute observation period. AVS given.  Plans for EGD 8/14. RTC 8/21 for next Venofer. Instructed to call provider for any questions or concerns. Verbalized understanding.

## 2025-08-08 LAB
W COMMON RAGWEED (SHORT), CLASS: NORMAL
W COMMON RAGWEED (SHORT), IGE: <0.1 KU/L
W COMMON SILVER BIRCH, CLASS: NORMAL
W COMMON SILVER BIRCH, IGE: <0.1 KU/L
W COW'S MILK, CLASS: NORMAL
W COW'S MILK, IGE: <0.1 KU/L
W CURVULARIA LUNATA, CLASS: NORMAL
W CURVULARIA LUNATA, IGE: <0.1 KU/L
W DERMATOPHAGOIDES FARINAE CLASS: ABNORMAL
W DERMATOPHAGOIDES FARINAE, IGE: 0.12 KU/L
W DERMATOPHAGOIDES PTERONYSSINUS CLASS: ABNORMAL
W DERMATOPHAGOIDES PTERONYSSINUS, IGE: 0.14 KU/L
W DOG DANDER, CLASS: ABNORMAL
W DOG DANDER, IGE: 0.74 KU/L
W EGG WHITE, CLASS: NORMAL
W EGG WHITE, IGE: <0.1 KU/L
W ENGLISH PLANTAIN, RIBWORT, CLASS: NORMAL
W ENGLISH PLANTAIN, RIBWORT, IGE: <0.1 KU/L
W MAPLE LEAF SYC., LONDON PLANE, CLASS: NORMAL
W MAPLE LEAF SYC., LONDON PLANE, IGE: <0.1 KU/L
W MUGWORT (SAGEBRUSH), CLASS: NORMAL
W MUGWORT (SAGEBRUSH), IGE: <0.1 KU/L
W OAK, CLASS: NORMAL
W OAK, IGE: <0.1 KU/L
W OAT , CLASS: NORMAL
W OAT , IGE: <0.1 KU/L
W PEANUT, CLASS: NORMAL
W PEANUT, IGE: <0.1 KU/L
W PECAN NUT, CLASS: NORMAL
W PECAN NUT, IGE: <0.1 KU/L
W PECAN, HICKORY, CLASS: NORMAL
W PECAN, HICKORY, IGE: <0.1 KU/L
W PENICILLIUM CHRYSOGENUM, CLASS: NORMAL
W PENICILLIUM CHRYSOGENUM, IGE: <0.1 KU/L
W ROUGH MARSHELDER, CLASS: NORMAL
W ROUGH MARSHELDER, IGE: <0.1 KU/L
W SALTWORT, RUSSIAN THISTLE, CLASS: NORMAL
W SALTWORT, RUSSIAN THISTLE, IGE: <0.1 KU/L
W SETOMELANOMMA ROSTRATA, CLASS: NORMAL
W SETOMELANOMMA ROSTRATA, IGE: <0.1 KU/L

## 2025-08-14 ENCOUNTER — HOSPITAL ENCOUNTER (OUTPATIENT)
Facility: HOSPITAL | Age: 75
Discharge: HOME | End: 2025-08-14
Attending: STUDENT IN AN ORGANIZED HEALTH CARE EDUCATION/TRAINING PROGRAM | Admitting: STUDENT IN AN ORGANIZED HEALTH CARE EDUCATION/TRAINING PROGRAM
Payer: MEDICARE

## 2025-08-14 ENCOUNTER — ANESTHESIA (OUTPATIENT)
Dept: ENDOSCOPY | Facility: HOSPITAL | Age: 75
End: 2025-08-14
Payer: MEDICARE

## 2025-08-14 VITALS
OXYGEN SATURATION: 100 % | DIASTOLIC BLOOD PRESSURE: 55 MMHG | HEIGHT: 63 IN | TEMPERATURE: 98 F | BODY MASS INDEX: 26.93 KG/M2 | WEIGHT: 152 LBS | RESPIRATION RATE: 16 BRPM | HEART RATE: 64 BPM | SYSTOLIC BLOOD PRESSURE: 129 MMHG

## 2025-08-14 DIAGNOSIS — D50.9 IDA (IRON DEFICIENCY ANEMIA): ICD-10-CM

## 2025-08-14 DIAGNOSIS — R10.9 ABDOMINAL PAIN, UNSPECIFIED ABDOMINAL LOCATION: ICD-10-CM

## 2025-08-14 DIAGNOSIS — D50.9 IRON DEFICIENCY ANEMIA, UNSPECIFIED IRON DEFICIENCY ANEMIA TYPE: ICD-10-CM

## 2025-08-14 PROCEDURE — 99900035 HC TECH TIME PER 15 MIN (STAT)

## 2025-08-14 PROCEDURE — 94761 N-INVAS EAR/PLS OXIMETRY MLT: CPT

## 2025-08-14 PROCEDURE — 63600175 PHARM REV CODE 636 W HCPCS: Performed by: NURSE ANESTHETIST, CERTIFIED REGISTERED

## 2025-08-14 PROCEDURE — 37000009 HC ANESTHESIA EA ADD 15 MINS: Performed by: STUDENT IN AN ORGANIZED HEALTH CARE EDUCATION/TRAINING PROGRAM

## 2025-08-14 PROCEDURE — 27201089 HC SNARE, DISP (ANY): Performed by: STUDENT IN AN ORGANIZED HEALTH CARE EDUCATION/TRAINING PROGRAM

## 2025-08-14 PROCEDURE — 43239 EGD BIOPSY SINGLE/MULTIPLE: CPT | Performed by: STUDENT IN AN ORGANIZED HEALTH CARE EDUCATION/TRAINING PROGRAM

## 2025-08-14 PROCEDURE — 88305 TISSUE EXAM BY PATHOLOGIST: CPT | Mod: TC | Performed by: STUDENT IN AN ORGANIZED HEALTH CARE EDUCATION/TRAINING PROGRAM

## 2025-08-14 PROCEDURE — 45385 COLONOSCOPY W/LESION REMOVAL: CPT | Performed by: STUDENT IN AN ORGANIZED HEALTH CARE EDUCATION/TRAINING PROGRAM

## 2025-08-14 PROCEDURE — 37000008 HC ANESTHESIA 1ST 15 MINUTES: Performed by: STUDENT IN AN ORGANIZED HEALTH CARE EDUCATION/TRAINING PROGRAM

## 2025-08-14 PROCEDURE — 43239 EGD BIOPSY SINGLE/MULTIPLE: CPT | Mod: 51,,, | Performed by: STUDENT IN AN ORGANIZED HEALTH CARE EDUCATION/TRAINING PROGRAM

## 2025-08-14 PROCEDURE — 27201012 HC FORCEPS, HOT/COLD, DISP: Performed by: STUDENT IN AN ORGANIZED HEALTH CARE EDUCATION/TRAINING PROGRAM

## 2025-08-14 PROCEDURE — 45385 COLONOSCOPY W/LESION REMOVAL: CPT | Mod: ,,, | Performed by: STUDENT IN AN ORGANIZED HEALTH CARE EDUCATION/TRAINING PROGRAM

## 2025-08-14 PROCEDURE — 25000003 PHARM REV CODE 250: Performed by: NURSE ANESTHETIST, CERTIFIED REGISTERED

## 2025-08-14 RX ORDER — LIDOCAINE HYDROCHLORIDE 20 MG/ML
INJECTION INTRAVENOUS
Status: DISCONTINUED | OUTPATIENT
Start: 2025-08-14 | End: 2025-08-14

## 2025-08-14 RX ORDER — SODIUM CHLORIDE 9 MG/ML
INJECTION, SOLUTION INTRAVENOUS CONTINUOUS
Status: DISCONTINUED | OUTPATIENT
Start: 2025-08-14 | End: 2025-08-14 | Stop reason: HOSPADM

## 2025-08-14 RX ORDER — PROPOFOL 10 MG/ML
VIAL (ML) INTRAVENOUS CONTINUOUS PRN
Status: DISCONTINUED | OUTPATIENT
Start: 2025-08-14 | End: 2025-08-14

## 2025-08-14 RX ORDER — OMEPRAZOLE 20 MG/1
20 CAPSULE, DELAYED RELEASE ORAL 2 TIMES DAILY
Qty: 60 CAPSULE | Refills: 2 | Status: SHIPPED | OUTPATIENT
Start: 2025-08-14 | End: 2025-08-18 | Stop reason: SDUPTHER

## 2025-08-14 RX ADMIN — LIDOCAINE HYDROCHLORIDE 100 MG: 20 INJECTION INTRAVENOUS at 08:08

## 2025-08-14 RX ADMIN — PROPOFOL 100 MG: 10 INJECTION, EMULSION INTRAVENOUS at 08:08

## 2025-08-14 RX ADMIN — SODIUM CHLORIDE: 0.9 INJECTION, SOLUTION INTRAVENOUS at 08:08

## 2025-08-14 RX ADMIN — PROPOFOL 125 MCG/KG/MIN: 10 INJECTION, EMULSION INTRAVENOUS at 08:08

## 2025-08-14 RX ADMIN — GLYCOPYRROLATE 0.1 MG: 0.2 INJECTION, SOLUTION INTRAMUSCULAR; INTRAVENOUS at 08:08

## 2025-08-18 DIAGNOSIS — K27.9 PUD (PEPTIC ULCER DISEASE): Primary | ICD-10-CM

## 2025-08-18 LAB
ESTROGEN SERPL-MCNC: NORMAL PG/ML
INSULIN SERPL-ACNC: NORMAL U[IU]/ML
LAB AP CLINICAL INFORMATION: NORMAL
LAB AP DIAGNOSIS CATEGORY: NORMAL
LAB AP GROSS DESCRIPTION: NORMAL
LAB AP PERFORMING LOCATION(S): NORMAL
LAB AP REPORT FOOTNOTES: NORMAL

## 2025-08-18 RX ORDER — OMEPRAZOLE 40 MG/1
40 CAPSULE, DELAYED RELEASE ORAL
Qty: 120 CAPSULE | Refills: 0 | Status: SHIPPED | OUTPATIENT
Start: 2025-08-18 | End: 2025-10-17

## 2025-08-21 ENCOUNTER — INFUSION (OUTPATIENT)
Dept: INFUSION THERAPY | Facility: HOSPITAL | Age: 75
End: 2025-08-21
Attending: PHYSICIAN ASSISTANT
Payer: MEDICARE

## 2025-08-21 VITALS
BODY MASS INDEX: 28.23 KG/M2 | WEIGHT: 159.31 LBS | HEIGHT: 63 IN | OXYGEN SATURATION: 99 % | SYSTOLIC BLOOD PRESSURE: 130 MMHG | DIASTOLIC BLOOD PRESSURE: 63 MMHG | RESPIRATION RATE: 18 BRPM | TEMPERATURE: 98 F | HEART RATE: 72 BPM

## 2025-08-21 DIAGNOSIS — D50.9 IRON DEFICIENCY ANEMIA, UNSPECIFIED IRON DEFICIENCY ANEMIA TYPE: Primary | ICD-10-CM

## 2025-08-21 DIAGNOSIS — J30.9 ALLERGIC RHINITIS, UNSPECIFIED SEASONALITY, UNSPECIFIED TRIGGER: ICD-10-CM

## 2025-08-21 PROCEDURE — 63600175 PHARM REV CODE 636 W HCPCS: Performed by: PHYSICIAN ASSISTANT

## 2025-08-21 PROCEDURE — 96374 THER/PROPH/DIAG INJ IV PUSH: CPT

## 2025-08-21 PROCEDURE — 25000003 PHARM REV CODE 250: Performed by: PHYSICIAN ASSISTANT

## 2025-08-21 PROCEDURE — A4216 STERILE WATER/SALINE, 10 ML: HCPCS | Performed by: PHYSICIAN ASSISTANT

## 2025-08-21 RX ORDER — HEPARIN 100 UNIT/ML
500 SYRINGE INTRAVENOUS
OUTPATIENT
Start: 2025-08-21

## 2025-08-21 RX ORDER — SODIUM CHLORIDE 0.9 % (FLUSH) 0.9 %
10 SYRINGE (ML) INJECTION
OUTPATIENT
Start: 2025-08-21

## 2025-08-21 RX ORDER — SODIUM CHLORIDE 0.9 % (FLUSH) 0.9 %
10 SYRINGE (ML) INJECTION
Status: DISCONTINUED | OUTPATIENT
Start: 2025-08-21 | End: 2025-08-21 | Stop reason: HOSPADM

## 2025-08-21 RX ORDER — EPINEPHRINE 0.3 MG/.3ML
0.3 INJECTION SUBCUTANEOUS ONCE AS NEEDED
OUTPATIENT
Start: 2025-08-21 | End: 2037-01-17

## 2025-08-21 RX ORDER — LEVOCETIRIZINE DIHYDROCHLORIDE 5 MG/1
5 TABLET, FILM COATED ORAL NIGHTLY
Qty: 30 TABLET | Refills: 11 | Status: SHIPPED | OUTPATIENT
Start: 2025-08-21 | End: 2026-08-21

## 2025-08-21 RX ADMIN — Medication 10 ML: at 02:08

## 2025-08-21 RX ADMIN — SODIUM CHLORIDE: 9 INJECTION, SOLUTION INTRAVENOUS at 02:08

## 2025-08-21 RX ADMIN — IRON SUCROSE 200 MG: 20 INJECTION, SOLUTION INTRAVENOUS at 02:08

## 2025-08-25 ENCOUNTER — PATIENT MESSAGE (OUTPATIENT)
Dept: ALLERGY | Facility: CLINIC | Age: 75
End: 2025-08-25
Payer: MEDICARE

## 2025-08-28 ENCOUNTER — INFUSION (OUTPATIENT)
Dept: INFUSION THERAPY | Facility: HOSPITAL | Age: 75
End: 2025-08-28
Attending: INTERNAL MEDICINE
Payer: MEDICARE

## 2025-08-28 VITALS
TEMPERATURE: 98 F | SYSTOLIC BLOOD PRESSURE: 132 MMHG | RESPIRATION RATE: 16 BRPM | DIASTOLIC BLOOD PRESSURE: 67 MMHG | HEART RATE: 86 BPM | OXYGEN SATURATION: 100 %

## 2025-08-28 DIAGNOSIS — D50.9 IRON DEFICIENCY ANEMIA, UNSPECIFIED IRON DEFICIENCY ANEMIA TYPE: Primary | ICD-10-CM

## 2025-08-28 PROCEDURE — 96374 THER/PROPH/DIAG INJ IV PUSH: CPT

## 2025-08-28 PROCEDURE — A4216 STERILE WATER/SALINE, 10 ML: HCPCS | Performed by: PHYSICIAN ASSISTANT

## 2025-08-28 PROCEDURE — 63600175 PHARM REV CODE 636 W HCPCS: Performed by: PHYSICIAN ASSISTANT

## 2025-08-28 PROCEDURE — 25000003 PHARM REV CODE 250: Performed by: PHYSICIAN ASSISTANT

## 2025-08-28 RX ORDER — SODIUM CHLORIDE 0.9 % (FLUSH) 0.9 %
10 SYRINGE (ML) INJECTION
Status: DISCONTINUED | OUTPATIENT
Start: 2025-08-28 | End: 2025-08-28 | Stop reason: HOSPADM

## 2025-08-28 RX ORDER — EPINEPHRINE 0.3 MG/.3ML
0.3 INJECTION SUBCUTANEOUS ONCE AS NEEDED
Status: DISCONTINUED | OUTPATIENT
Start: 2025-08-28 | End: 2025-08-28 | Stop reason: HOSPADM

## 2025-08-28 RX ORDER — HEPARIN 100 UNIT/ML
500 SYRINGE INTRAVENOUS
OUTPATIENT
Start: 2025-08-28

## 2025-08-28 RX ORDER — EPINEPHRINE 0.3 MG/.3ML
0.3 INJECTION SUBCUTANEOUS ONCE AS NEEDED
OUTPATIENT
Start: 2025-08-28 | End: 2037-01-24

## 2025-08-28 RX ORDER — SODIUM CHLORIDE 0.9 % (FLUSH) 0.9 %
10 SYRINGE (ML) INJECTION
OUTPATIENT
Start: 2025-08-28

## 2025-08-28 RX ADMIN — IRON SUCROSE 200 MG: 20 INJECTION, SOLUTION INTRAVENOUS at 02:08

## 2025-08-28 RX ADMIN — Medication 10 ML: at 02:08

## 2025-09-04 ENCOUNTER — PATIENT MESSAGE (OUTPATIENT)
Dept: GASTROENTEROLOGY | Facility: CLINIC | Age: 75
End: 2025-09-04
Payer: MEDICARE

## 2025-09-04 ENCOUNTER — INFUSION (OUTPATIENT)
Dept: INFUSION THERAPY | Facility: HOSPITAL | Age: 75
End: 2025-09-04
Attending: PHYSICIAN ASSISTANT
Payer: MEDICARE

## 2025-09-04 VITALS
TEMPERATURE: 99 F | RESPIRATION RATE: 18 BRPM | OXYGEN SATURATION: 100 % | HEART RATE: 81 BPM | SYSTOLIC BLOOD PRESSURE: 135 MMHG | DIASTOLIC BLOOD PRESSURE: 66 MMHG

## 2025-09-04 DIAGNOSIS — D50.9 IRON DEFICIENCY ANEMIA, UNSPECIFIED IRON DEFICIENCY ANEMIA TYPE: Primary | ICD-10-CM

## 2025-09-04 PROCEDURE — 63600175 PHARM REV CODE 636 W HCPCS: Performed by: PHYSICIAN ASSISTANT

## 2025-09-04 PROCEDURE — 25000003 PHARM REV CODE 250: Performed by: PHYSICIAN ASSISTANT

## 2025-09-04 PROCEDURE — A4216 STERILE WATER/SALINE, 10 ML: HCPCS | Performed by: PHYSICIAN ASSISTANT

## 2025-09-04 PROCEDURE — 96374 THER/PROPH/DIAG INJ IV PUSH: CPT

## 2025-09-04 RX ORDER — HEPARIN 100 UNIT/ML
500 SYRINGE INTRAVENOUS
OUTPATIENT
Start: 2025-09-04

## 2025-09-04 RX ORDER — EPINEPHRINE 0.3 MG/.3ML
0.3 INJECTION SUBCUTANEOUS ONCE AS NEEDED
Status: DISCONTINUED | OUTPATIENT
Start: 2025-09-04 | End: 2025-09-04 | Stop reason: HOSPADM

## 2025-09-04 RX ORDER — SODIUM CHLORIDE 0.9 % (FLUSH) 0.9 %
10 SYRINGE (ML) INJECTION
OUTPATIENT
Start: 2025-09-04

## 2025-09-04 RX ORDER — SODIUM CHLORIDE 0.9 % (FLUSH) 0.9 %
10 SYRINGE (ML) INJECTION
Status: DISCONTINUED | OUTPATIENT
Start: 2025-09-04 | End: 2025-09-04 | Stop reason: HOSPADM

## 2025-09-04 RX ORDER — EPINEPHRINE 0.3 MG/.3ML
0.3 INJECTION SUBCUTANEOUS ONCE AS NEEDED
OUTPATIENT
Start: 2025-09-04 | End: 2037-01-31

## 2025-09-04 RX ADMIN — IRON SUCROSE 200 MG: 20 INJECTION, SOLUTION INTRAVENOUS at 01:09

## 2025-09-04 RX ADMIN — SODIUM CHLORIDE: 9 INJECTION, SOLUTION INTRAVENOUS at 02:09

## 2025-09-04 RX ADMIN — Medication 10 ML: at 02:09
